# Patient Record
Sex: MALE | Race: WHITE | Employment: OTHER | URBAN - METROPOLITAN AREA
[De-identification: names, ages, dates, MRNs, and addresses within clinical notes are randomized per-mention and may not be internally consistent; named-entity substitution may affect disease eponyms.]

---

## 2017-09-18 ENCOUNTER — HOSPITAL ENCOUNTER (OUTPATIENT)
Age: 62
Discharge: HOME | End: 2017-09-18
Attending: INTERNAL MEDICINE
Payer: COMMERCIAL

## 2017-09-18 DIAGNOSIS — F41.9 ANXIETY: ICD-10-CM

## 2017-09-18 DIAGNOSIS — G47.00 INSOMNIA: ICD-10-CM

## 2017-09-18 DIAGNOSIS — R53.1 RIGHT SIDED WEAKNESS: ICD-10-CM

## 2017-09-18 LAB — CREAT UR-MCNC: 1.2 MG/DL (ref 0.6–1.3)

## 2017-09-18 PROCEDURE — 70553 MRI BRAIN STEM W/O & W/DYE: CPT

## 2017-09-18 PROCEDURE — 74011250636 HC RX REV CODE- 250/636

## 2017-09-18 PROCEDURE — A9585 GADOBUTROL INJECTION: HCPCS

## 2017-09-18 PROCEDURE — 82565 ASSAY OF CREATININE: CPT

## 2017-09-18 RX ADMIN — GADOBUTROL 7.5 ML: 604.72 INJECTION INTRAVENOUS at 18:00

## 2017-11-28 ENCOUNTER — OFFICE VISIT (OUTPATIENT)
Dept: NEUROLOGY | Age: 62
End: 2017-11-28

## 2017-11-28 VITALS
SYSTOLIC BLOOD PRESSURE: 142 MMHG | DIASTOLIC BLOOD PRESSURE: 84 MMHG | BODY MASS INDEX: 24.77 KG/M2 | RESPIRATION RATE: 16 BRPM | HEIGHT: 70 IN | HEART RATE: 69 BPM | TEMPERATURE: 96.9 F | OXYGEN SATURATION: 96 % | WEIGHT: 173 LBS

## 2017-11-28 DIAGNOSIS — G21.11 NEUROLEPTIC-INDUCED PARKINSONISM (HCC): Primary | ICD-10-CM

## 2017-11-28 RX ORDER — BENZTROPINE MESYLATE 1 MG/1
TABLET ORAL
Refills: 0 | COMMUNITY
Start: 2017-11-14

## 2017-11-28 RX ORDER — TRAZODONE HYDROCHLORIDE 50 MG/1
TABLET ORAL
Refills: 0 | COMMUNITY
Start: 2017-11-13

## 2017-11-28 RX ORDER — ESZOPICLONE 3 MG/1
TABLET, FILM COATED ORAL
COMMUNITY
Start: 2017-11-26

## 2017-11-28 RX ORDER — OLANZAPINE 5 MG/1
TABLET ORAL
Refills: 3 | COMMUNITY
Start: 2017-11-09

## 2017-11-28 RX ORDER — OMEPRAZOLE 20 MG/1
CAPSULE, DELAYED RELEASE ORAL
Refills: 1 | COMMUNITY
Start: 2017-11-13

## 2017-11-28 RX ORDER — HYDROXYZINE PAMOATE 50 MG/1
CAPSULE ORAL
Refills: 2 | COMMUNITY
Start: 2017-11-13

## 2017-11-28 NOTE — COMMUNICATION BODY
Macy Delarosa is a 57 y.o., right handed male, with an established history of hypertension, bipolar disorder, who comes in with complaints of difficulty of walking. He feels as if he can't seem to start waling easily. He takes small steps. He's also noted a shaking/tremor of the right hand more than the left. He feels weak and has no energy. This has been going on for about 8 months to one year. He does see a psychiatrist who placed him on [facility] for the gait disorder, but he hasn't noticed any improvement in the gait and jerking of the hand. Prior to a year ago he had some involvement with shaking and involvement of the right arm and abnormal movements of the tongue. He had this even prior to addition of the neuroleptics he's currently on.  He's been diagnosed with [employer] dance. Social History; he's  lives with his wife. He drink occasionally, drinks 2-3 cups of wine every other day. He denies use of tobacco currently, quit 18 months ago a PPD for years. No illicit drugs. He's retired from [employer], he was an . Family History; mother  from old age, father  from cancer, had hypertension. 6 siblings, prostate cancer and stroke. Current Outpatient Prescriptions   Medication Sig Dispense Refill    OLANZapine (ZYPREXA) 5 mg tablet TK 1 T PO QAM  3    omeprazole (PRILOSEC) 20 mg capsule TK 1 C PO QD 30 MIN AC  1    hydrOXYzine pamoate (VISTARIL) 50 mg capsule TK ONE C PO  Q 12 H PRA  2    benztropine (COGENTIN) 1 mg tablet TK 1 T PO Q 12 H  0    eszopiclone (LUNESTA) 3 mg tablet       traZODone (DESYREL) 50 mg tablet TK 1 TO 2 TS PO HS  0    VALBENAZINE TOSYLATE (INGREZZA PO) Take 80 mg by mouth daily.  cholecalciferol (VITAMIN D3) 1,000 unit tablet Take  by mouth daily.  ascorbic acid (VITAMIN C) 500 mg tablet Take 1,000 mg by mouth daily.  lisinopril-hydrochlorothiazide (ZESTORETIC) 10-12.5 mg per tablet Take  by mouth daily. Past Medical History:   Diagnosis Date    Detached retina     Elevated PSA     GERD (gastroesophageal reflux disease)     Hypertension     Lumbar back pain     Osteopenia     Prostate cancer [facility])        Past Surgical History:   Procedure Laterality Date    HX CATARACT REMOVAL      HX HERNIA REPAIR Bilateral 1973    HX LUMBAR LAMINECTOMY  2009    HX PROSTATECTOMY         Allergies   Allergen Reactions    Morphine Other (comments)     hallucination       Patient Active Problem List   Diagnosis Code    Hypertension I10    Glaucoma H40.9    Lumbar back pain M54.5    Osteopenia M85.80    Elevated PSA R97.20    Prostate cancer ([address] C61    Chest pain on exertion R07.9    Tobacco abuse counseling Z71.6    Cancer of prostate with high recurrence risk (stage T3a or Lou 8-10 or PSA > 20) (HCC) C61    Erectile dysfunction following radical prostatectomy N52.31         Review of Systems: prostate cancer, received radiation therapy. As above otherwise 11 point review of systems negative including;   Constitutional no fever or chills  Skin denies rash or itching  HENT  Denies tinnitus, hearing lose  Eyes denies diplopia vision lose  Respiratory denies shortness of breath  Cardiovascular denies chest pain, dyspnea on exertion  Gastrointestinal denies nausea, vomiting, diarrhea, constipation  Genitourinary denies incontinence  Musculoskeletal denies joint pain or swelling  Endocrine denies weight change  Hematology denies easy bruising or bleeding   Neurological as above in HPI      PHYSICAL EXAMINATION:      VITAL SIGNS:    Visit Vitals    /84 (BP 1 Location: Right arm, BP Patient Position: Sitting)    Pulse 69    Temp 96.9 °F (36.1 °C) (Oral)    Resp 16    Ht 5' 10\" (1.778 m)    Wt 78.5 kg (173 lb)    SpO2 96%    BMI 24.82 kg/m2       GENERAL: The patient is well developed, well nourished, and in no apparent distress. EXTREMITIES: No clubbing, cyanosis, or edema is identified. Pulses 2+ and symmetrical.  Muscle tone is normal.  HEAD:   Ear, nose, and throat appear to be without trauma. The patient is normocephalic. NEUROLOGIC EXAMINATION    MENTAL STATUS: The patient is awake, alert, and oriented x 4. Fund of knowledge is adequate. Speech is fluent and memory appears to be intact, both long and short term. CRANIAL NERVES: II  Visual fields are full to confrontation. Funduscopic examination reveals flat disks bilaterally. Pupils are both 4 mm and briskly reactive to light and accommodation. III, IV, VI  Extraocular movements are intact and there is no nystagmus. V  Facial sensation is intact to pinprick and light touch. VII  Face is symmetrical.  He has masked facies. VIII - Hearing is present. IX, X, [address] rises symmetrically. Gag is present. Tongue is in the midline. XI - Shoulder shrugging and head turning intact  MOTOR:  The patient is 5/5 in all four limbs without any drift. Fine finger movements are symmetrical.  Isolated motor group testing reveals no focal abnormalities. Tone is normal.  Sensory examination is intact to pinprick, light touch and position sense testing. Reflexes are 2+ and symmetrical. Plantars are down going. Cerebellar examination reveals no gross ataxia or dysmetria. Gait is abnormal, he has small steps and tends to shuffle. He has prominent cogwheel rigidity of the right worse than the left arm. He has great difficulty getting up from a chair without the use of his arms.          CBC:   Lab Results   Component Value Date/Time    WBC 12.3 10/02/2015 12:06 PM    RBC 4.83 10/02/2015 12:06 PM    HGB 12.4 10/17/2015 05:30 AM    HCT 37.2 10/17/2015 05:30 AM    PLATELET 375 64/74/5687 12:06 PM     BMP:   Lab Results   Component Value Date/Time    Glucose 99 10/17/2015 05:30 AM    Sodium 142 10/17/2015 05:30 AM    Potassium 4.2 10/17/2015 05:30 AM    Chloride 104 10/17/2015 05:30 AM    CO2 31 10/17/2015 05:30 AM    BUN 12 10/17/2015 05:30 AM    Creatinine 1.08 10/17/2015 05:30 AM    Calcium 8.1 10/17/2015 05:30 AM     CMP:   Lab Results   Component Value Date/Time    Glucose 99 10/17/2015 05:30 AM    Sodium 142 10/17/2015 05:30 AM    Potassium 4.2 10/17/2015 05:30 AM    Chloride 104 10/17/2015 05:30 AM    CO2 31 10/17/2015 05:30 AM    BUN 12 10/17/2015 05:30 AM    Creatinine 1.08 10/17/2015 05:30 AM    Calcium 8.1 10/17/2015 05:30 AM    Anion gap 7 10/17/2015 05:30 AM    BUN/Creatinine ratio 11 10/17/2015 05:30 AM     Coagulation: No results found for: PTP, INR, APTT, PTTT  Cardiac markers:   Lab Results   Component Value Date/Time    CK 89 10/18/2015 03:33 PM    CK-MB Index 1.0 10/18/2015 03:33 PM          Impression: Significant parkinsonism in this patient who has risk factors including exposure to Zyprexa over the past 6 months or so. It seems that his symptoms may have started at the same time that he was started on the Zyprexa for his depression. He has masked facies, postural instability, short steps as well as a shuffling gait. He also has cogwheel rigidity. All these really are pointing toward a parkinsonism. Insofar as has been on the Zyprexa for the past 6 months I cannot call it idiopathic Parkinson's disease since he has been exposed to a drug that does have potential side effects of Parkinson's. Plan: Before starting him on any medications at like to see how he does off of this medication. I will not take him off the medicine but asked that a psychiatrist consider tapering and switching his medication regimen. I see that he was placed on the valbenzine, which can help with a tardive dyskinesia but it does not seem to really make a difference in this man's parkinsonism. If his symptoms persist after withdrawal from this medication then I would strongly consider placing him on medicines for Parkinson's disease and then will consider him to have idiopathic parkinsonism. No further workup warranted at this time.   Will follow closely with you. PLEASE NOTE:   This document has been produced using voice recognition software. Unrecognized errors in transcription may be present.

## 2017-11-28 NOTE — PROGRESS NOTES
Gabriela Collier is a 57 y.o., right handed male, with an established history of hypertension, bipolar disorder, who comes in with complaints of difficulty of walking. He feels as if he can't seem to start waling easily. He takes small steps. He's also noted a shaking/tremor of the right hand more than the left. He feels weak and has no energy. This has been going on for about 8 months to one year. He does see a psychiatrist who placed him on [employer] [facility] for the gait disorder, but he hasn't noticed any improvement in the gait and jerking of the hand. Prior to a year ago he had some involvement with shaking and involvement of the right arm and abnormal movements of the tongue. He had this even prior to addition of the neuroleptics he's currently on.  He's been diagnosed with [employer] dance. Social History; he's  lives with his wife. He drink occasionally, drinks 2-3 cups of wine every other day. He denies use of tobacco currently, quit 18 months ago a PPD for years. No illicit drugs. He's retired from [employer], he was an . Family History; mother  from old age, father  from cancer, had hypertension. 6 siblings, prostate cancer and stroke. Current Outpatient Prescriptions   Medication Sig Dispense Refill    OLANZapine (ZYPREXA) 5 mg tablet TK 1 T PO QAM  3    omeprazole (PRILOSEC) 20 mg capsule TK 1 C PO QD 30 MIN AC  1    hydrOXYzine pamoate (VISTARIL) 50 mg capsule TK ONE C PO  Q 12 H PRA  2    benztropine (COGENTIN) 1 mg tablet TK 1 T PO Q 12 H  0    eszopiclone (LUNESTA) 3 mg tablet       traZODone (DESYREL) 50 mg tablet TK 1 TO 2 TS PO HS  0    VALBENAZINE TOSYLATE (INGREZZA PO) Take 80 mg by mouth daily.  cholecalciferol (VITAMIN D3) 1,000 unit tablet Take  by mouth daily.  ascorbic acid (VITAMIN C) 500 mg tablet Take 1,000 mg by mouth daily.  lisinopril-hydrochlorothiazide (ZESTORETIC) 10-12.5 mg per tablet Take  by mouth daily. Past Medical History:   Diagnosis Date    Detached retina     Elevated PSA     GERD (gastroesophageal reflux disease)     Hypertension     Lumbar back pain     Osteopenia     Prostate cancer [facility])        Past Surgical History:   Procedure Laterality Date    HX CATARACT REMOVAL      HX HERNIA REPAIR Bilateral 1973    HX LUMBAR LAMINECTOMY  2009    HX PROSTATECTOMY         Allergies   Allergen Reactions    Morphine Other (comments)     hallucination       Patient Active Problem List   Diagnosis Code    Hypertension I10    Glaucoma H40.9    Lumbar back pain M54.5    Osteopenia M85.80    Elevated PSA R97.20    Prostate cancer ([address] C61    Chest pain on exertion R07.9    Tobacco abuse counseling Z71.6    Cancer of prostate with high recurrence risk (stage T3a or Lou 8-10 or PSA > 20) (HCC) C61    Erectile dysfunction following radical prostatectomy N52.31         Review of Systems: prostate cancer, received radiation therapy. As above otherwise 11 point review of systems negative including;   Constitutional no fever or chills  Skin denies rash or itching  HENT  Denies tinnitus, hearing lose  Eyes denies diplopia vision lose  Respiratory denies shortness of breath  Cardiovascular denies chest pain, dyspnea on exertion  Gastrointestinal denies nausea, vomiting, diarrhea, constipation  Genitourinary denies incontinence  Musculoskeletal denies joint pain or swelling  Endocrine denies weight change  Hematology denies easy bruising or bleeding   Neurological as above in HPI      PHYSICAL EXAMINATION:      VITAL SIGNS:    Visit Vitals    /84 (BP 1 Location: Right arm, BP Patient Position: Sitting)    Pulse 69    Temp 96.9 °F (36.1 °C) (Oral)    Resp 16    Ht 5' 10\" (1.778 m)    Wt 78.5 kg (173 lb)    SpO2 96%    BMI 24.82 kg/m2       GENERAL: The patient is well developed, well nourished, and in no apparent distress. EXTREMITIES: No clubbing, cyanosis, or edema is identified. Pulses 2+ and symmetrical.  Muscle tone is normal.  HEAD:   Ear, nose, and throat appear to be without trauma. The patient is normocephalic. NEUROLOGIC EXAMINATION    MENTAL STATUS: The patient is awake, alert, and oriented x 4. Fund of knowledge is adequate. Speech is fluent and memory appears to be intact, both long and short term. CRANIAL NERVES: II  Visual fields are full to confrontation. Funduscopic examination reveals flat disks bilaterally. Pupils are both 4 mm and briskly reactive to light and accommodation. III, IV, VI  Extraocular movements are intact and there is no nystagmus. V  Facial sensation is intact to pinprick and light touch. VII  Face is symmetrical.  He has masked facies. VIII - Hearing is present. IX, X, [address] rises symmetrically. Gag is present. Tongue is in the midline. XI - Shoulder shrugging and head turning intact  MOTOR:  The patient is 5/5 in all four limbs without any drift. Fine finger movements are symmetrical.  Isolated motor group testing reveals no focal abnormalities. Tone is normal.  Sensory examination is intact to pinprick, light touch and position sense testing. Reflexes are 2+ and symmetrical. Plantars are down going. Cerebellar examination reveals no gross ataxia or dysmetria. Gait is abnormal, he has small steps and tends to shuffle. He has prominent cogwheel rigidity of the right worse than the left arm. He has great difficulty getting up from a chair without the use of his arms.          CBC:   Lab Results   Component Value Date/Time    WBC 12.3 10/02/2015 12:06 PM    RBC 4.83 10/02/2015 12:06 PM    HGB 12.4 10/17/2015 05:30 AM    HCT 37.2 10/17/2015 05:30 AM    PLATELET 835 13/16/0196 12:06 PM     BMP:   Lab Results   Component Value Date/Time    Glucose 99 10/17/2015 05:30 AM    Sodium 142 10/17/2015 05:30 AM    Potassium 4.2 10/17/2015 05:30 AM    Chloride 104 10/17/2015 05:30 AM    CO2 31 10/17/2015 05:30 AM    BUN 12 10/17/2015 05:30 AM    Creatinine 1.08 10/17/2015 05:30 AM    Calcium 8.1 10/17/2015 05:30 AM     CMP:   Lab Results   Component Value Date/Time    Glucose 99 10/17/2015 05:30 AM    Sodium 142 10/17/2015 05:30 AM    Potassium 4.2 10/17/2015 05:30 AM    Chloride 104 10/17/2015 05:30 AM    CO2 31 10/17/2015 05:30 AM    BUN 12 10/17/2015 05:30 AM    Creatinine 1.08 10/17/2015 05:30 AM    Calcium 8.1 10/17/2015 05:30 AM    Anion gap 7 10/17/2015 05:30 AM    BUN/Creatinine ratio 11 10/17/2015 05:30 AM     Coagulation: No results found for: PTP, INR, APTT, PTTT  Cardiac markers:   Lab Results   Component Value Date/Time    CK 89 10/18/2015 03:33 PM    CK-MB Index 1.0 10/18/2015 03:33 PM          Impression: Significant parkinsonism in this patient who has risk factors including exposure to Zyprexa over the past 6 months or so. It seems that his symptoms may have started at the same time that he was started on the Zyprexa for his depression. He has masked facies, postural instability, short steps as well as a shuffling gait. He also has cogwheel rigidity. All these really are pointing toward a parkinsonism. Insofar as has been on the Zyprexa for the past 6 months I cannot call it idiopathic Parkinson's disease since he has been exposed to a drug that does have potential side effects of Parkinson's. Plan: Before starting him on any medications at like to see how he does off of this medication. I will not take him off the medicine but asked that a psychiatrist consider tapering and switching his medication regimen. I see that he was placed on the valbenzine, which can help with a tardive dyskinesia but it does not seem to really make a difference in this man's parkinsonism. If his symptoms persist after withdrawal from this medication then I would strongly consider placing him on medicines for Parkinson's disease and then will consider him to have idiopathic parkinsonism. No further workup warranted at this time.   Will follow closely with you. PLEASE NOTE:   This document has been produced using voice recognition software. Unrecognized errors in transcription may be present.

## 2017-11-28 NOTE — PROGRESS NOTES
Chief Complaint   Patient presents with   Pascual Establish Care    Neurologic Problem     Referred by Dr. Krystal Sacks for gait eval

## 2017-11-28 NOTE — MR AVS SNAPSHOT
Visit Information Date & Time Provider Department Dept. Phone Encounter #  
 11/28/2017 10:00 AM Tracey Rangel MD [address] [place] [address] at [address] [place] [address] [id number] Follow-up Instructions Return in about 4 weeks (around 12/26/2017). Follow-up and Disposition History Your Appointments 11/29/2017 10:15 AM  
Nurse Visit with CRISTIANE [facility] [address] ([address]) Appt Note: PSA  
 [address] [place] [address] [address] [facility] [id number]  
  
    
 12/14/2017 10:30 AM  
ESTABLISHED PATIENT with Minal Draper MD  
[facility] [address]) Appt Note: 6mo F/u PSA Prior [address] [facility] [address] [facility] Upcoming Health Maintenance Date Due Hepatitis C Screening 1955 DTaP/Tdap/Td series (1 - Tdap) 3/18/1974 FOBT Q 1 YEAR AGE 50-75 3/18/2003 ZOSTER VACCINE AGE 60> 1/18/2013 Pneumococcal 19-64 Highest Risk (2 of 3 - PCV13) 10/6/2016 Influenza Age 5 to Adult 8/1/2017 Allergies as of 11/28/2017  Review Complete On: 11/28/2017 By: Alena Collins LPN Severity Noted Reaction Type Reactions Morphine  10/07/2015    Other (comments)  
 hallucination Current Immunizations  Never Reviewed Name Date Influenza Vaccine (Quad) PF 10/19/2015  4:34 PM  
  
 Not reviewed this visit You Were Diagnosed With   
  
 Codes Comments Neuroleptic-induced Parkinsonism ([facility]    -  Primary ICD-10-CM: G21.11 ICD-9-CM: 332.1, T411.4 Vitals BP Pulse Temp Resp Height(growth percentile) Weight(growth percentile) 142/84 (BP 1 Location: Right arm, BP Patient Position: Sitting) 69 96.9 °F (36.1 °C) (Oral) 16 5' 10\" (1.778 m) 173 lb (78.5 kg) SpO2 BMI Smoking Status 96% 24.82 kg/m2 Former Smoker Vitals History BMI and BSA Data Remember, MyChart is NOT to be used for urgent needs. For medical emergencies, dial 911. Now available from your iPhone and Android! Please provide this summary of care documentation to your next provider. Your primary care clinician is listed as Homero Roberts. If you have any questions after today's visit, please call [phone].

## 2017-11-28 NOTE — LETTER
2017 10:57 AM 
 
Patient:  Mirian Peter YOB: 1955 Date of Visit: 2017 Dear Dena García MD 
[address] [facility] [address] VIA Facsimile: [phone] Kari Yancye NP 
[facility] [place] [address] [place] Facsimile: [phone] 
 : 
 
 
Thank you for referring Mr. Mirian Peter to me for evaluation/treatment. Below are the relevant portions of my assessment and plan of care. Mirian Peter is a 57 y.o., right handed male, with an established history of hypertension, bipolar disorder, who comes in with complaints of difficulty of walking. He feels as if he can't seem to start waling easily. He takes small steps. He's also noted a shaking/tremor of the right hand more than the left. He feels weak and has no energy. This has been going on for about 8 months to one year. He does see a psychiatrist who placed him on [place] Broom for the gait disorder, but he hasn't noticed any improvement in the gait and jerking of the hand. Prior to a year ago he had some involvement with shaking and involvement of the right arm and abnormal movements of the tongue. He had this even prior to addition of the neuroleptics he's currently on.  He's been diagnosed with [employer] dance. Social History; he's  lives with his wife. He drink occasionally, drinks 2-3 cups of wine every other day. He denies use of tobacco currently, quit 18 months ago a PPD for years. No illicit drugs. He's retired from [employer], he was an . Family History; mother  from old age, father  from cancer, had hypertension. 6 siblings, prostate cancer and stroke. Current Outpatient Prescriptions Medication Sig Dispense Refill  OLANZapine (ZYPREXA) 5 mg tablet TK 1 T PO QAM  3  
 omeprazole (PRILOSEC) 20 mg capsule TK 1 C PO QD 30 MIN AC  1  
 hydrOXYzine pamoate (VISTARIL) 50 mg capsule TK ONE C PO  Q 12 H PRA  2  
  benztropine (COGENTIN) 1 mg tablet TK 1 T PO Q 12 H  0  
 eszopiclone (LUNESTA) 3 mg tablet  traZODone (DESYREL) 50 mg tablet TK 1 TO 2 TS PO HS  0  
 VALBENAZINE TOSYLATE (INGREZZA PO) Take 80 mg by mouth daily.  cholecalciferol (VITAMIN D3) 1,000 unit tablet Take  by mouth daily.  ascorbic acid (VITAMIN C) 500 mg tablet Take 1,000 mg by mouth daily.  lisinopril-hydrochlorothiazide (ZESTORETIC) 10-12.5 mg per tablet Take  by mouth daily. Past Medical History:  
Diagnosis Date  Detached retina  Elevated PSA  GERD (gastroesophageal reflux disease)  Hypertension  Lumbar back pain  Osteopenia  Prostate cancer ([address] Past Surgical History:  
Procedure Laterality Date  HX CATARACT REMOVAL    
 HX HERNIA REPAIR Bilateral 1973  HX LUMBAR LAMINECTOMY  2009  HX PROSTATECTOMY Allergies Allergen Reactions  Morphine Other (comments)  
  hallucination Patient Active Problem List  
Diagnosis Code  Hypertension I10  
 Glaucoma H40.9  Lumbar back pain M54.5  Osteopenia M85.80  Elevated PSA R97.20  Prostate cancer ([address] C61  Chest pain on exertion R07.9  Tobacco abuse counseling Z71.6  Cancer of prostate with high recurrence risk (stage T3a or [place] 8-10 or PSA > 20) ([address] C61  Erectile dysfunction following radical prostatectomy N52.31 Review of Systems: prostate cancer, received radiation therapy. As above otherwise 11 point review of systems negative including;  
Constitutional no fever or chills Skin denies rash or itching HENT  Denies tinnitus, hearing lose Eyes denies diplopia vision lose Respiratory denies shortness of breath Cardiovascular denies chest pain, dyspnea on exertion Gastrointestinal denies nausea, vomiting, diarrhea, constipation Genitourinary denies incontinence Musculoskeletal denies joint pain or swelling Endocrine denies weight change Hematology denies easy bruising or bleeding Neurological as above in HPI PHYSICAL EXAMINATION:   
 
VITAL SIGNS:   
Visit Vitals  /84 (BP 1 Location: Right arm, BP Patient Position: Sitting)  Pulse 69  Temp 96.9 °F (36.1 °C) (Oral)  Resp 16  
 Ht 5' 10\" (1.778 m)  Wt 78.5 kg (173 lb)  SpO2 96%  BMI 24.82 kg/m2 GENERAL: The patient is well developed, well nourished, and in no apparent distress. EXTREMITIES: No clubbing, cyanosis, or edema is identified. Pulses 2+ and symmetrical.  Muscle tone is normal. 
HEAD:   Ear, nose, and throat appear to be without trauma. The patient is normocephalic. NEUROLOGIC EXAMINATION 
 
MENTAL STATUS: The patient is awake, alert, and oriented x 4. Fund of knowledge is adequate. Speech is fluent and memory appears to be intact, both long and short term. CRANIAL NERVES: II  Visual fields are full to confrontation. Funduscopic examination reveals flat disks bilaterally. Pupils are both 4 mm and briskly reactive to light and accommodation. III, IV, VI  Extraocular movements are intact and there is no nystagmus. V  Facial sensation is intact to pinprick and light touch. VII  Face is symmetrical.  He has masked facies. VIII - Hearing is present. IX, X, [address] rises symmetrically. Gag is present. Tongue is in the midline. XI - Shoulder shrugging and head turning intact MOTOR:  The patient is 5/5 in all four limbs without any drift. Fine finger movements are symmetrical.  Isolated motor group testing reveals no focal abnormalities. Tone is normal.  Sensory examination is intact to pinprick, light touch and position sense testing. Reflexes are 2+ and symmetrical. Plantars are down going. Cerebellar examination reveals no gross ataxia or dysmetria. Gait is abnormal, he has small steps and tends to shuffle.   He has prominent cogwheel rigidity of the right worse than the left arm. He has great difficulty getting up from a chair without the use of his arms. CBC:  
Lab Results Component Value Date/Time WBC 12.3 10/02/2015 12:06 PM  
 RBC 4.83 10/02/2015 12:06 PM  
 HGB 12.4 10/17/2015 05:30 AM  
 HCT 37.2 10/17/2015 05:30 AM  
 PLATELET 480 69/00/0462 12:06 PM  
 
BMP:  
Lab Results Component Value Date/Time Glucose 99 10/17/2015 05:30 AM  
 Sodium 142 10/17/2015 05:30 AM  
 Potassium 4.2 10/17/2015 05:30 AM  
 Chloride 104 10/17/2015 05:30 AM  
 CO2 31 10/17/2015 05:30 AM  
 BUN 12 10/17/2015 05:30 AM  
 Creatinine 1.08 10/17/2015 05:30 AM  
 Calcium 8.1 10/17/2015 05:30 AM  
 
CMP:  
Lab Results Component Value Date/Time Glucose 99 10/17/2015 05:30 AM  
 Sodium 142 10/17/2015 05:30 AM  
 Potassium 4.2 10/17/2015 05:30 AM  
 Chloride 104 10/17/2015 05:30 AM  
 CO2 31 10/17/2015 05:30 AM  
 BUN 12 10/17/2015 05:30 AM  
 Creatinine 1.08 10/17/2015 05:30 AM  
 Calcium 8.1 10/17/2015 05:30 AM  
 Anion gap 7 10/17/2015 05:30 AM  
 BUN/Creatinine ratio 11 10/17/2015 05:30 AM  
 
Coagulation: No results found for: PTP, INR, APTT, PTTT Cardiac markers:  
Lab Results Component Value Date/Time CK 89 10/18/2015 03:33 PM  
 CK-MB Index 1.0 10/18/2015 03:33 PM  
  
 
 
Impression: Significant parkinsonism in this patient who has risk factors including exposure to Zyprexa over the past 6 months or so. It seems that his symptoms may have started at the same time that he was started on the Zyprexa for his depression. He has masked facies, postural instability, short steps as well as a shuffling gait. He also has cogwheel rigidity. All these really are pointing toward a parkinsonism. Insofar as has been on the Zyprexa for the past 6 months I cannot call it idiopathic Parkinson's disease since he has been exposed to a drug that does have potential side effects of Parkinson's.  
 
Plan: Before starting him on any medications at like to see how he does off of this medication. I will not take him off the medicine but asked that a psychiatrist consider tapering and switching his medication regimen. I see that he was placed on the valbenzine, which can help with a tardive dyskinesia but it does not seem to really make a difference in this man's parkinsonism. If his symptoms persist after withdrawal from this medication then I would strongly consider placing him on medicines for Parkinson's disease and then will consider him to have idiopathic parkinsonism. No further workup warranted at this time. Will follow closely with you. PLEASE NOTE:  
This document has been produced using voice recognition software. Unrecognized errors in transcription may be present. If you have questions, please do not hesitate to call me. I look forward to following Mr. Linda Morris along with you.  
 
 
 
Sincerely, 
 
 
Sigifredo Hernandez MD

## 2017-12-28 ENCOUNTER — TELEPHONE (OUTPATIENT)
Dept: NEUROLOGY | Age: 62
End: 2017-12-28

## 2017-12-28 NOTE — TELEPHONE ENCOUNTER
Amanda Alfred N.P. From [address] [place] [address] calling to speak with Dr. Mily Andujar regarding an evaluation for this pt ASAP. Best # to call is [phone].

## 2018-01-08 NOTE — TELEPHONE ENCOUNTER
Patient wanted to inform Dr. Carmel Roe that mental health doctor placed him back on zyprexa. States that he didn't know if Dr. Carmel Roe wanted to see him in the office since this change. Pt was informed of Dr. Carmel Roe return on today from vacation and that his mental health provider has also left a message for consult with Dr. Carmel Roe as well and will be following up at earliest convience. He verbalized understanding.  Please advise

## 2018-01-10 NOTE — TELEPHONE ENCOUNTER
Matilda Haley MD   You 35 minutes ago (8:55 AM)                 Routine follow up is ok unless his symptoms worsen (Routing comment)         Patient called and informed of Dr. Petey Covarrubias response. Patient verbalized understanding and denies any further issues/ questions.

## 2018-01-30 ENCOUNTER — OFFICE VISIT (OUTPATIENT)
Dept: NEUROLOGY | Age: 63
End: 2018-01-30

## 2018-01-30 VITALS
HEIGHT: 70 IN | OXYGEN SATURATION: 97 % | DIASTOLIC BLOOD PRESSURE: 82 MMHG | TEMPERATURE: 97 F | SYSTOLIC BLOOD PRESSURE: 130 MMHG | RESPIRATION RATE: 16 BRPM | HEART RATE: 67 BPM

## 2018-01-30 DIAGNOSIS — G20 PARKINSON DISEASE (HCC): Primary | ICD-10-CM

## 2018-01-30 RX ORDER — CARBIDOPA AND LEVODOPA 25; 100 MG/1; MG/1
1 TABLET ORAL 3 TIMES DAILY
Qty: 90 TAB | Refills: 5 | Status: SHIPPED | OUTPATIENT
Start: 2018-01-30 | End: 2018-08-26 | Stop reason: SDUPTHER

## 2018-01-30 NOTE — ACP (ADVANCE CARE PLANNING)
1. Have you been to the ER, urgent care clinic since your last visit? Hospitalized since your last visit? No    2. Have you seen or consulted any other health care providers outside of the [address] [place] [address] since your last visit? Include any pap smears or colon screening.  No     Chief Complaint   Patient presents with    Follow-up    Neurologic Problem     Parkinson's

## 2018-01-30 NOTE — LETTER
1/30/2018 4:14 PM 
 
Patient:  Luci Burgess YOB: 1955 Date of Visit: 1/30/2018 Dear Jason Mercer MD 
[address] [id number] [place] [id number] VIA Facsimile: [phone] 
 : Thank you for referring Mr. Luci Burgess to me for evaluation/treatment. Below are the relevant portions of my assessment and plan of care. Re:  Simon Frank up visit     1/30/2018 4:08 PM 
 
SSN: [id number] Subjective:   Luci Burgess returns for follow up. He stopped the Zyprexa, which made little change in his status. He's since gone back on the Zyprexa and feels better but still is plagued with bradykinesia. He notices significant difficulty with sleep. Medications:   
Current Outpatient Prescriptions Medication Sig Dispense Refill  OLANZapine (ZYPREXA) 5 mg tablet TK 1 T PO QAM  3  
 omeprazole (PRILOSEC) 20 mg capsule TK 1 C PO QD 30 MIN AC  1  
 hydrOXYzine pamoate (VISTARIL) 50 mg capsule TK ONE C PO  Q 12 H PRA  2  
 benztropine (COGENTIN) 1 mg tablet TK 1 T PO Q 12 H  0  
 eszopiclone (LUNESTA) 3 mg tablet  traZODone (DESYREL) 50 mg tablet TK 1 TO 2 TS PO HS  0  
 VALBENAZINE TOSYLATE (INGREZZA PO) Take 80 mg by mouth daily.  cholecalciferol (VITAMIN D3) 1,000 unit tablet Take  by mouth daily.  ascorbic acid (VITAMIN C) 500 mg tablet Take 1,000 mg by mouth daily.  lisinopril-hydrochlorothiazide (ZESTORETIC) 10-12.5 mg per tablet Take  by mouth daily. Vital signs:   
Visit Vitals  /82 (BP 1 Location: Right arm, BP Patient Position: Sitting)  Pulse 67  Temp 97 °F (36.1 °C) (Oral)  Resp 16  
 Ht 5' 10\" (1.778 m)  SpO2 97% Review of Systems: As above otherwise 11 point review of systems negative including;  
Constitutional no fever or chills Skin denies rash or itching HEENT  Denies tinnitus, hearing lose Eyes denies diplopia vision lose Respiratory denies sortness of breath Cardiovascular denies chest pain, dyspnea on exertion Gastrointestinal denies nausea, vomiting, diarrhea, constipation Genitourinary denies incontinence Musculoskeletal denies joint pain or swelling Endocrine denies weight change Hematology denies easy bruising or bleeding Neurological as above in HPI Patient Active Problem List  
Diagnosis Code  Hypertension I10  
 Glaucoma H40.9  Lumbar back pain M54.5  Osteopenia M85.80  Elevated PSA R97.20  Prostate cancer ([facility] C61  Chest pain on exertion R07.9  Tobacco abuse counseling Z71.6  Cancer of prostate with high recurrence risk (stage T3a or [place] 8-10 or PSA > 20) ([facility] C61  Erectile dysfunction following radical prostatectomy N52.31 Objective: The patient is awake, alert, and oriented x 4. Fund of knowledge is adequate. Speech is fluent and memory is intact. Cranial Nerves: II  Visual fields are full to confrontation. III, IV, VI  Extraocular movements are intact. There is no nystagmus. V  Facial sensation is intact to pinprick. VII  Face is symmetrical.  VIII - Hearing is present. IX, X, XII  Palate is symmetrical.   XI - Shoulder shrugging and head turning intact Motor: The patient moves all four limbs fairly well and symmetrically. Tone is normal. Reflexes are 2+ and symmetrical. Plantars are down going. Gait is normal.  He has right much worse than the left cogwheel rigidity. CBC:  
Lab Results Component Value Date/Time WBC 12.3 10/02/2015 12:06 PM  
 RBC 4.83 10/02/2015 12:06 PM  
 HGB 12.4 10/17/2015 05:30 AM  
 HCT 37.2 10/17/2015 05:30 AM  
 PLATELET 361 47/10/8799 12:06 PM  
 
BMP:  
Lab Results Component Value Date/Time  Glucose 99 10/17/2015 05:30 AM  
 Sodium 142 10/17/2015 05:30 AM  
 Potassium 4.2 10/17/2015 05:30 AM  
 Chloride 104 10/17/2015 05:30 AM  
 CO2 31 10/17/2015 05:30 AM  
 BUN 12 10/17/2015 05:30 AM  
 Creatinine 1.08 10/17/2015 05:30 AM  
 Calcium 8.1 10/17/2015 05:30 AM  
 
CMP:  
Lab Results Component Value Date/Time Glucose 99 10/17/2015 05:30 AM  
 Sodium 142 10/17/2015 05:30 AM  
 Potassium 4.2 10/17/2015 05:30 AM  
 Chloride 104 10/17/2015 05:30 AM  
 CO2 31 10/17/2015 05:30 AM  
 BUN 12 10/17/2015 05:30 AM  
 Creatinine 1.08 10/17/2015 05:30 AM  
 Calcium 8.1 10/17/2015 05:30 AM  
 Anion gap 7 10/17/2015 05:30 AM  
 BUN/Creatinine ratio 11 10/17/2015 05:30 AM  
 
Coagulation: No results found for: PTP, INR, APTT, PTTT Assessment:  Probably has idiopathic Parkinson's disease in this man who has risk factors including exposure to neuroleptics in the past and currently. I suspect he should be treated at this time with medications for his Parkinson's disease. Plan: Will start on Sinemet 25/100 TID and see him back here here in about 4 weeks. Sincerely, 
 
 
 
Jaswinder Madrid. Anne Marie Stoll M.D. If you have questions, please do not hesitate to call me. I look forward to following Mr. Zack Cardona along with you.  
 
 
 
Sincerely, 
 
 
Martin Cristobal MD

## 2018-01-30 NOTE — MR AVS SNAPSHOT
[address] [facility] 
[phone] Patient: Jaren Lagos MRN: [id number] KASSY:[id number] Visit Information Date & Time Provider Department Dept. Phone Encounter #  
 1/30/2018  4:00 PM Monico Snellen, MD [facility] at [address] [facility] [id number] Follow-up Instructions Return in about 4 weeks (around 2/27/2018). Follow-up and Disposition History Your Appointments 4/3/2018  4:00 PM  
Follow Up with Monico Snellen, MD  
[facility] at [address] [facility]) Appt Note: 4 week fu  
 [address] [facility] [address]  
[id number] [place] [address]  
  
    
 6/5/2018  1:45 PM  
Nurse Visit with [facility] ([facility]) Appt Note: PSA  
 [address] [address]  
  
    
 6/19/2018  1:45 PM  
ESTABLISHED PATIENT with Jama Johnson MD  
[facility]) Appt Note: 6 [facility] PSA PRIOR  
 2000 [facility] [address] Upcoming Health Maintenance Date Due Hepatitis C Screening 1955 DTaP/Tdap/Td series (1 - Tdap) 3/18/1974 FOBT Q 1 YEAR AGE 50-75 3/18/2003 ZOSTER VACCINE AGE 60> 1/18/2013 Pneumococcal 19-64 Highest Risk (2 of 3 - PCV13) 10/6/2016 Influenza Age 5 to Adult 8/1/2017 Allergies as of 1/30/2018  Review Complete On: 1/30/2018 By: Thea Breen LPN Severity Noted Reaction Type Reactions Morphine  10/07/2015    Other (comments)  
 hallucination Current Immunizations  Never Reviewed Name Date Influenza Vaccine (Quad) PF 10/19/2015  4:34 PM  
  
 Not reviewed this visit You Were Diagnosed With   
  
 Codes Comments Parkinson disease ([facility]    -  Primary ICD-10-CM: G20 
ICD-9-CM: 332.0 Vitals BP Pulse Temp Resp Height(growth percentile) SpO2  
 130/82 (BP 1 Location: Right arm, BP Patient Position: Sitting) 67 97 °F (36.1 °C) (Oral) 16 5' 10\" (1.778 m) 97% Smoking Status Former Smoker Vitals History Preferred Pharmacy Pharmacy Name Phone [facility] [phone], [address] AT [address] [phone] Your Updated Medication List  
  
   
This list is accurate as of: 1/30/18  4:19 PM.  Always use your most recent med list.  
  
  
  
  
 benztropine 1 mg tablet Commonly known as:  COGENTIN  
TK 1 T PO Q 12 H  
  
 carbidopa-levodopa  mg per tablet Commonly known as:  SINEMET Take 1 Tab by mouth three (3) times daily. eszopiclone 3 mg tablet Commonly known as:  LUNESTA  
  
 hydrOXYzine pamoate 50 mg capsule Commonly known as:  VISTARIL TK ONE C PO  Q 12 H PRA INGREZZA PO Take 80 mg by mouth daily. OLANZapine 5 mg tablet Commonly known as:  ZyPREXA TK 1 T PO QAM  
  
 omeprazole 20 mg capsule Commonly known as:  PRILOSEC TK 1 C PO QD 30 MIN AC  
  
 traZODone 50 mg tablet Commonly known as:  DESYREL  
TK 1 TO 2 TS PO HS  
  
 VITAMIN C 500 mg tablet Generic drug:  ascorbic acid (vitamin C) Take 1,000 mg by mouth daily. VITAMIN D3 1,000 unit tablet Generic drug:  cholecalciferol Take  by mouth daily. ZESTORETIC 10-12.5 mg per tablet Generic drug:  lisinopril-hydroCHLOROthiazide Take  by mouth daily. Prescriptions Sent to Pharmacy Refills  
 carbidopa-levodopa (SINEMET)  mg per tablet 5 Sig: Take 1 Tab by mouth three (3) times daily. Class: Normal  
 Pharmacy: [facility] [address] [place] [address] AT [address] Ph #: [phone] Route: Oral  
  
Follow-up Instructions Return in about 4 weeks (around 2/27/2018). Introducing [facility] HE[facility]! Dear Kristen Daugherty: Thank you for requesting a [employer] account. Our records indicate that you already have an active [employer] account. You can access your account anytime at [email or web address] Did you know that you can access your hospital and ER discharge instructions at any time in [employer] You can also review all of your test results from your hospital stay or ER visit. Additional Information If you have questions, please visit the Frequently Asked Questions section of the [employer] website at [email or web address] Remember, [employer] is NOT to be used for urgent needs. For medical emergencies, dial 911. Now available from your iPhone and Android! Please provide this summary of care documentation to your next provider. Your primary care clinician is listed as Sundeep Schmidt. If you have any questions after today's visit, please call [phone].

## 2018-01-30 NOTE — COMMUNICATION BODY
Re:  Binu Mae up visit     1/30/2018 4:08 PM    SSN: [id number]    Subjective:   Francisca Ritchie returns for follow up. He stopped the Zyprexa, which made little change in his status. He's since gone back on the Zyprexa and feels better but still is plagued with bradykinesia. He notices significant difficulty with sleep. Medications:    Current Outpatient Prescriptions   Medication Sig Dispense Refill    OLANZapine (ZYPREXA) 5 mg tablet TK 1 T PO QAM  3    omeprazole (PRILOSEC) 20 mg capsule TK 1 C PO QD 30 MIN AC  1    hydrOXYzine pamoate (VISTARIL) 50 mg capsule TK ONE C PO  Q 12 H PRA  2    benztropine (COGENTIN) 1 mg tablet TK 1 T PO Q 12 H  0    eszopiclone (LUNESTA) 3 mg tablet       traZODone (DESYREL) 50 mg tablet TK 1 TO 2 TS PO HS  0    VALBENAZINE TOSYLATE (INGREZZA PO) Take 80 mg by mouth daily.  cholecalciferol (VITAMIN D3) 1,000 unit tablet Take  by mouth daily.  ascorbic acid (VITAMIN C) 500 mg tablet Take 1,000 mg by mouth daily.  lisinopril-hydrochlorothiazide (ZESTORETIC) 10-12.5 mg per tablet Take  by mouth daily.          Vital signs:    Visit Vitals    /82 (BP 1 Location: Right arm, BP Patient Position: Sitting)    Pulse 67    Temp 97 °F (36.1 °C) (Oral)    Resp 16    Ht 5' 10\" (1.778 m)    SpO2 97%       Review of Systems:   As above otherwise 11 point review of systems negative including;   Constitutional no fever or chills  Skin denies rash or itching  HEENT  Denies tinnitus, hearing lose  Eyes denies diplopia vision lose  Respiratory denies sortness of breath  Cardiovascular denies chest pain, dyspnea on exertion  Gastrointestinal denies nausea, vomiting, diarrhea, constipation  Genitourinary denies incontinence  Musculoskeletal denies joint pain or swelling  Endocrine denies weight change  Hematology denies easy bruising or bleeding   Neurological as above in HPI      Patient Active Problem List   Diagnosis Code    Hypertension I10    Glaucoma H40.9    Lumbar back pain M54.5    Osteopenia M85.80    Elevated PSA R97.20    Prostate cancer (HCC) C61    Chest pain on exertion R07.9    Tobacco abuse counseling Z71.6    Cancer of prostate with high recurrence risk (stage T3a or [place] 8-10 or PSA > 20) ([facility]) C61    Erectile dysfunction following radical prostatectomy N52.31         Objective: The patient is awake, alert, and oriented x 4. Fund of knowledge is adequate. Speech is fluent and memory is intact. Cranial Nerves: II - Visual fields are full to confrontation. III, IV, VI - Extraocular movements are intact. There is no nystagmus. V - Facial sensation is intact to pinprick. VII - Face is symmetrical.  VIII - Hearing is present. IX, X, XII - Palate is symmetrical.   XI - Shoulder shrugging and head turning intact  Motor: The patient moves all four limbs fairly well and symmetrically. Tone is normal. Reflexes are 2+ and symmetrical. Plantars are down going. Gait is normal.  He has right much worse than the left cogwheel rigidity.     CBC:   Lab Results   Component Value Date/Time    WBC 12.3 10/02/2015 12:06 PM    RBC 4.83 10/02/2015 12:06 PM    HGB 12.4 10/17/2015 05:30 AM    HCT 37.2 10/17/2015 05:30 AM    PLATELET 407 79/66/8514 12:06 PM     BMP:   Lab Results   Component Value Date/Time    Glucose 99 10/17/2015 05:30 AM    Sodium 142 10/17/2015 05:30 AM    Potassium 4.2 10/17/2015 05:30 AM    Chloride 104 10/17/2015 05:30 AM    CO2 31 10/17/2015 05:30 AM    BUN 12 10/17/2015 05:30 AM    Creatinine 1.08 10/17/2015 05:30 AM    Calcium 8.1 10/17/2015 05:30 AM     CMP:   Lab Results   Component Value Date/Time    Glucose 99 10/17/2015 05:30 AM    Sodium 142 10/17/2015 05:30 AM    Potassium 4.2 10/17/2015 05:30 AM    Chloride 104 10/17/2015 05:30 AM    CO2 31 10/17/2015 05:30 AM    BUN 12 10/17/2015 05:30 AM    Creatinine 1.08 10/17/2015 05:30 AM    Calcium 8.1 10/17/2015 05:30 AM    Anion gap 7 10/17/2015 05:30 AM    BUN/Creatinine ratio 11 10/17/2015 05:30 AM     Coagulation: No results found for: PTP, INR, APTT, PTTT    Assessment:  Probably has idiopathic Parkinson's disease in this man who has risk factors including exposure to neuroleptics in the past and currently. I suspect he should be treated at this time with medications for his Parkinson's disease. Plan: Will start on Sinemet 25/100 TID and see him back here here in about 4 weeks. Sincerely,        Elizabeth Martínez.  Lisa Ibarra M.D.

## 2018-01-30 NOTE — PROGRESS NOTES
Re:  Chantelle Sing up visit     1/30/2018 4:08 PM    SSN: [id number]    Subjective:   Efraín Boyer returns for follow up. He stopped the Zyprexa, which made little change in his status. He's since gone back on the Zyprexa and feels better but still is plagued with bradykinesia. He notices significant difficulty with sleep. Medications:    Current Outpatient Prescriptions   Medication Sig Dispense Refill    OLANZapine (ZYPREXA) 5 mg tablet TK 1 T PO QAM  3    omeprazole (PRILOSEC) 20 mg capsule TK 1 C PO QD 30 MIN AC  1    hydrOXYzine pamoate (VISTARIL) 50 mg capsule TK ONE C PO  Q 12 H PRA  2    benztropine (COGENTIN) 1 mg tablet TK 1 T PO Q 12 H  0    eszopiclone (LUNESTA) 3 mg tablet       traZODone (DESYREL) 50 mg tablet TK 1 TO 2 TS PO HS  0    VALBENAZINE TOSYLATE (INGREZZA PO) Take 80 mg by mouth daily.  cholecalciferol (VITAMIN D3) 1,000 unit tablet Take  by mouth daily.  ascorbic acid (VITAMIN C) 500 mg tablet Take 1,000 mg by mouth daily.  lisinopril-hydrochlorothiazide (ZESTORETIC) 10-12.5 mg per tablet Take  by mouth daily.          Vital signs:    Visit Vitals    /82 (BP 1 Location: Right arm, BP Patient Position: Sitting)    Pulse 67    Temp 97 °F (36.1 °C) (Oral)    Resp 16    Ht 5' 10\" (1.778 m)    SpO2 97%       Review of Systems:   As above otherwise 11 point review of systems negative including;   Constitutional no fever or chills  Skin denies rash or itching  HEENT  Denies tinnitus, hearing lose  Eyes denies diplopia vision lose  Respiratory denies sortness of breath  Cardiovascular denies chest pain, dyspnea on exertion  Gastrointestinal denies nausea, vomiting, diarrhea, constipation  Genitourinary denies incontinence  Musculoskeletal denies joint pain or swelling  Endocrine denies weight change  Hematology denies easy bruising or bleeding   Neurological as above in HPI      Patient Active Problem List   Diagnosis Code    Hypertension I10    Glaucoma H40.9    Lumbar back pain M54.5    Osteopenia M85.80    Elevated PSA R97.20    Prostate cancer (HCC) C61    Chest pain on exertion R07.9    Tobacco abuse counseling Z71.6    Cancer of prostate with high recurrence risk (stage T3a or [place] 8-10 or PSA > 20) ([facility]) C61    Erectile dysfunction following radical prostatectomy N52.31         Objective: The patient is awake, alert, and oriented x 4. Fund of knowledge is adequate. Speech is fluent and memory is intact. Cranial Nerves: II - Visual fields are full to confrontation. III, IV, VI - Extraocular movements are intact. There is no nystagmus. V - Facial sensation is intact to pinprick. VII - Face is symmetrical.  VIII - Hearing is present. IX, X, XII - Palate is symmetrical.   XI - Shoulder shrugging and head turning intact  Motor: The patient moves all four limbs fairly well and symmetrically. Tone is normal. Reflexes are 2+ and symmetrical. Plantars are down going. Gait is normal.  He has right much worse than the left cogwheel rigidity.     CBC:   Lab Results   Component Value Date/Time    WBC 12.3 10/02/2015 12:06 PM    RBC 4.83 10/02/2015 12:06 PM    HGB 12.4 10/17/2015 05:30 AM    HCT 37.2 10/17/2015 05:30 AM    PLATELET 783 71/66/1384 12:06 PM     BMP:   Lab Results   Component Value Date/Time    Glucose 99 10/17/2015 05:30 AM    Sodium 142 10/17/2015 05:30 AM    Potassium 4.2 10/17/2015 05:30 AM    Chloride 104 10/17/2015 05:30 AM    CO2 31 10/17/2015 05:30 AM    BUN 12 10/17/2015 05:30 AM    Creatinine 1.08 10/17/2015 05:30 AM    Calcium 8.1 10/17/2015 05:30 AM     CMP:   Lab Results   Component Value Date/Time    Glucose 99 10/17/2015 05:30 AM    Sodium 142 10/17/2015 05:30 AM    Potassium 4.2 10/17/2015 05:30 AM    Chloride 104 10/17/2015 05:30 AM    CO2 31 10/17/2015 05:30 AM    BUN 12 10/17/2015 05:30 AM    Creatinine 1.08 10/17/2015 05:30 AM    Calcium 8.1 10/17/2015 05:30 AM    Anion gap 7 10/17/2015 05:30 AM    BUN/Creatinine ratio 11 10/17/2015 05:30 AM     Coagulation: No results found for: PTP, INR, APTT, PTTT    Assessment:  Probably has idiopathic Parkinson's disease in this man who has risk factors including exposure to neuroleptics in the past and currently. I suspect he should be treated at this time with medications for his Parkinson's disease. Plan: Will start on Sinemet 25/100 TID and see him back here here in about 4 weeks. Sincerely,        Laverne Patel M.D.

## 2018-04-03 ENCOUNTER — OFFICE VISIT (OUTPATIENT)
Dept: NEUROLOGY | Age: 63
End: 2018-04-03

## 2018-04-03 VITALS
TEMPERATURE: 97.3 F | HEART RATE: 80 BPM | HEIGHT: 70 IN | RESPIRATION RATE: 16 BRPM | WEIGHT: 175 LBS | SYSTOLIC BLOOD PRESSURE: 118 MMHG | BODY MASS INDEX: 25.05 KG/M2 | DIASTOLIC BLOOD PRESSURE: 82 MMHG | OXYGEN SATURATION: 93 %

## 2018-04-03 DIAGNOSIS — G20 PARKINSON DISEASE (HCC): Primary | ICD-10-CM

## 2018-04-03 RX ORDER — PRAMIPEXOLE DIHYDROCHLORIDE 0.25 MG/1
0.25 TABLET ORAL 3 TIMES DAILY
Qty: 90 TAB | Refills: 6 | Status: SHIPPED | OUTPATIENT
Start: 2018-04-03 | End: 2019-10-03 | Stop reason: SDUPTHER

## 2018-04-03 NOTE — PROGRESS NOTES
Chief Complaint   Patient presents with    Follow-up    Neurologic Problem     Parkinson's     1. Have you been to the ER, urgent care clinic since your last visit? Hospitalized since your last visit? No    2. Have you seen or consulted any other health care providers outside of the [address] [place] [address] since your last visit? Include any pap smears or colon screening. No    Fall Risk Assessment, last 12 mths 4/3/2018   Able to walk? Yes   Fall in past 12 months?  No

## 2018-04-03 NOTE — PROGRESS NOTES
Re:  Esthela Parents up visit     4/3/2018 2:58 PM      SSN: [id number]    Subjective:   Williams Capone returns for follow up. He's not had any dramatic improvement in his gait. No side effect with the Sinemet. Medications:    Current Outpatient Prescriptions   Medication Sig Dispense Refill    carbidopa-levodopa (SINEMET)  mg per tablet Take 1 Tab by mouth three (3) times daily. 90 Tab 5    OLANZapine (ZYPREXA) 5 mg tablet TK 1 T PO QAM  3    omeprazole (PRILOSEC) 20 mg capsule TK 1 C PO QD 30 MIN AC  1    hydrOXYzine pamoate (VISTARIL) 50 mg capsule TK ONE C PO  Q 12 H PRA  2    benztropine (COGENTIN) 1 mg tablet TK 1 T PO Q 12 H  0    eszopiclone (LUNESTA) 3 mg tablet       traZODone (DESYREL) 50 mg tablet TK 1 TO 2 TS PO HS  0    VALBENAZINE TOSYLATE (INGREZZA PO) Take 80 mg by mouth daily.  cholecalciferol (VITAMIN D3) 1,000 unit tablet Take  by mouth daily.  ascorbic acid (VITAMIN C) 500 mg tablet Take 1,000 mg by mouth daily.  lisinopril-hydrochlorothiazide (ZESTORETIC) 10-12.5 mg per tablet Take  by mouth daily.          Vital signs:    Visit Vitals    /82 (BP 1 Location: Right arm, BP Patient Position: Sitting)    Pulse 80    Temp 97.3 °F (36.3 °C) (Oral)    Resp 16    Ht 5' 10\" (1.778 m)    Wt 79.4 kg (175 lb)    SpO2 93%    BMI 25.11 kg/m2       Review of Systems:   As above otherwise 11 point review of systems negative including;   Constitutional no fever or chills  Skin denies rash or itching  HEENT  Denies tinnitus, hearing lose  Eyes denies diplopia vision lose  Respiratory denies sortness of breath  Cardiovascular denies chest pain, dyspnea on exertion  Gastrointestinal denies nausea, vomiting, diarrhea, constipation  Genitourinary denies incontinence  Musculoskeletal denies joint pain or swelling  Endocrine denies weight change  Hematology denies easy bruising or bleeding   Neurological as above in HPI      Patient Active Problem List   Diagnosis Code    Hypertension I10    Glaucoma H40.9    Lumbar back pain M54.5    Osteopenia M85.80    Elevated PSA R97.20    Prostate cancer ([address] C61    Chest pain on exertion R07.9    Tobacco abuse counseling Z71.6    Cancer of prostate with high recurrence risk (stage T3a or Lou 8-10 or PSA > 20) C61    Erectile dysfunction following radical prostatectomy N52.31    Parkinson disease ([facility] G20         Objective: The patient is awake, alert, and oriented x 4. Fund of knowledge is adequate. Speech is fluent and memory is intact. Cranial Nerves: II  Visual fields are full to confrontation. III, IV, VI  Extraocular movements are intact. There is no nystagmus. V  Facial sensation is intact to pinprick. VII  Face is symmetrical.  VIII - Hearing is present. IX, X, XII  Palate is symmetrical.   XI - Shoulder shrugging and head turning intact  Motor: The patient moves all four limbs fairly well and symmetrically. Tone is normal. Reflexes are 2+ and symmetrical. Plantars are down going. Gait is normal.  He has right much worse than the left cogwheel rigidity.     CBC:   Lab Results   Component Value Date/Time    WBC 12.3 10/02/2015 12:06 PM    RBC 4.83 10/02/2015 12:06 PM    HGB 12.4 (L) 10/17/2015 05:30 AM    HCT 37.2 10/17/2015 05:30 AM    PLATELET 429 72/69/8237 12:06 PM     BMP:   Lab Results   Component Value Date/Time    Glucose 99 10/17/2015 05:30 AM    Sodium 142 10/17/2015 05:30 AM    Potassium 4.2 10/17/2015 05:30 AM    Chloride 104 10/17/2015 05:30 AM    CO2 31 10/17/2015 05:30 AM    BUN 12 10/17/2015 05:30 AM    Creatinine 1.08 10/17/2015 05:30 AM    Calcium 8.1 (L) 10/17/2015 05:30 AM     CMP:   Lab Results   Component Value Date/Time    Glucose 99 10/17/2015 05:30 AM    Sodium 142 10/17/2015 05:30 AM    Potassium 4.2 10/17/2015 05:30 AM    Chloride 104 10/17/2015 05:30 AM    CO2 31 10/17/2015 05:30 AM    BUN 12 10/17/2015 05:30 AM    Creatinine 1.08 10/17/2015 05:30 AM Calcium 8.1 (L) 10/17/2015 05:30 AM    Anion gap 7 10/17/2015 05:30 AM    BUN/Creatinine ratio 11 (L) 10/17/2015 05:30 AM     Coagulation: No results found for: PTP, INR, APTT, PTTT    Assessment:  Probably has idiopathic Parkinson's disease in this man who has risk factors including exposure to neuroleptics in the past and currently. Little improvement with low dose Sinemet. Plan: Will start on low dose Mirapex and see him back here here in about 4 weeks. Sincerely,        Otilio Alfred.  Mane Brasher M.D.

## 2018-04-03 NOTE — COMMUNICATION BODY
Re:  Elder Cousin up visit     4/3/2018 2:58 PM      SSN: [id number]    Subjective:   Dearchris Casillas returns for follow up. He's not had any dramatic improvement in his gait. No side effect with the Sinemet. Medications:    Current Outpatient Prescriptions   Medication Sig Dispense Refill    carbidopa-levodopa (SINEMET)  mg per tablet Take 1 Tab by mouth three (3) times daily. 90 Tab 5    OLANZapine (ZYPREXA) 5 mg tablet TK 1 T PO QAM  3    omeprazole (PRILOSEC) 20 mg capsule TK 1 C PO QD 30 MIN AC  1    hydrOXYzine pamoate (VISTARIL) 50 mg capsule TK ONE C PO  Q 12 H PRA  2    benztropine (COGENTIN) 1 mg tablet TK 1 T PO Q 12 H  0    eszopiclone (LUNESTA) 3 mg tablet       traZODone (DESYREL) 50 mg tablet TK 1 TO 2 TS PO HS  0    VALBENAZINE TOSYLATE (INGREZZA PO) Take 80 mg by mouth daily.  cholecalciferol (VITAMIN D3) 1,000 unit tablet Take  by mouth daily.  ascorbic acid (VITAMIN C) 500 mg tablet Take 1,000 mg by mouth daily.  lisinopril-hydrochlorothiazide (ZESTORETIC) 10-12.5 mg per tablet Take  by mouth daily.          Vital signs:    Visit Vitals    /82 (BP 1 Location: Right arm, BP Patient Position: Sitting)    Pulse 80    Temp 97.3 °F (36.3 °C) (Oral)    Resp 16    Ht 5' 10\" (1.778 m)    Wt 79.4 kg (175 lb)    SpO2 93%    BMI 25.11 kg/m2       Review of Systems:   As above otherwise 11 point review of systems negative including;   Constitutional no fever or chills  Skin denies rash or itching  HEENT  Denies tinnitus, hearing lose  Eyes denies diplopia vision lose  Respiratory denies sortness of breath  Cardiovascular denies chest pain, dyspnea on exertion  Gastrointestinal denies nausea, vomiting, diarrhea, constipation  Genitourinary denies incontinence  Musculoskeletal denies joint pain or swelling  Endocrine denies weight change  Hematology denies easy bruising or bleeding   Neurological as above in HPI      Patient Active Problem List   Diagnosis Code    Hypertension I10    Glaucoma H40.9    Lumbar back pain M54.5    Osteopenia M85.80    Elevated PSA R97.20    Prostate cancer ([address] C61    Chest pain on exertion R07.9    Tobacco abuse counseling Z71.6    Cancer of prostate with high recurrence risk (stage T3a or Lou 8-10 or PSA > 20) C61    Erectile dysfunction following radical prostatectomy N52.31    Parkinson disease ([facility] [address] G20         Objective: The patient is awake, alert, and oriented x 4. Fund of knowledge is adequate. Speech is fluent and memory is intact. Cranial Nerves: II  Visual fields are full to confrontation. III, IV, VI  Extraocular movements are intact. There is no nystagmus. V  Facial sensation is intact to pinprick. VII  Face is symmetrical.  VIII - Hearing is present. IX, X, XII  Palate is symmetrical.   XI - Shoulder shrugging and head turning intact  Motor: The patient moves all four limbs fairly well and symmetrically. Tone is normal. Reflexes are 2+ and symmetrical. Plantars are down going. Gait is normal.  He has right much worse than the left cogwheel rigidity.     CBC:   Lab Results   Component Value Date/Time    WBC 12.3 10/02/2015 12:06 PM    RBC 4.83 10/02/2015 12:06 PM    HGB 12.4 (L) 10/17/2015 05:30 AM    HCT 37.2 10/17/2015 05:30 AM    PLATELET 581 33/52/8310 12:06 PM     BMP:   Lab Results   Component Value Date/Time    Glucose 99 10/17/2015 05:30 AM    Sodium 142 10/17/2015 05:30 AM    Potassium 4.2 10/17/2015 05:30 AM    Chloride 104 10/17/2015 05:30 AM    CO2 31 10/17/2015 05:30 AM    BUN 12 10/17/2015 05:30 AM    Creatinine 1.08 10/17/2015 05:30 AM    Calcium 8.1 (L) 10/17/2015 05:30 AM     CMP:   Lab Results   Component Value Date/Time    Glucose 99 10/17/2015 05:30 AM    Sodium 142 10/17/2015 05:30 AM    Potassium 4.2 10/17/2015 05:30 AM    Chloride 104 10/17/2015 05:30 AM    CO2 31 10/17/2015 05:30 AM    BUN 12 10/17/2015 05:30 AM    Creatinine 1.08 10/17/2015 05:30 AM Calcium 8.1 (L) 10/17/2015 05:30 AM    Anion gap 7 10/17/2015 05:30 AM    BUN/Creatinine ratio 11 (L) 10/17/2015 05:30 AM     Coagulation: No results found for: PTP, INR, APTT, PTTT    Assessment:  Probably has idiopathic Parkinson's disease in this man who has risk factors including exposure to neuroleptics in the past and currently. Little improvement with low dose Sinemet. Plan: Will start on low dose Mirapex and see him back here here in about 4 weeks. Sincerely,        Tom Holland.  Webster Litten, M.D.

## 2018-04-03 NOTE — LETTER
4/3/2018 3:01 PM 
 
Patient:  Marta Diaz YOB: 1955 Date of Visit: 4/3/2018 Dear Fermin Rider MD 
[address] [place] [address] VIA Facsimile: [phone] 
 : Thank you for referring Mr. Marta Diaz to me for evaluation/treatment. Below are the relevant portions of my assessment and plan of care. Re:  Lori Nohemy up visit     4/3/2018 2:58 PM 
 
 
SSN: [id number] Subjective:   Marta Diaz returns for follow up. He's not had any dramatic improvement in his gait. No side effect with the Sinemet. Medications:   
Current Outpatient Prescriptions Medication Sig Dispense Refill  carbidopa-levodopa (SINEMET)  mg per tablet Take 1 Tab by mouth three (3) times daily. 90 Tab 5  OLANZapine (ZYPREXA) 5 mg tablet TK 1 T PO QAM  3  
 omeprazole (PRILOSEC) 20 mg capsule TK 1 C PO QD 30 MIN AC  1  
 hydrOXYzine pamoate (VISTARIL) 50 mg capsule TK ONE C PO  Q 12 H PRA  2  
 benztropine (COGENTIN) 1 mg tablet TK 1 T PO Q 12 H  0  
 eszopiclone (LUNESTA) 3 mg tablet  traZODone (DESYREL) 50 mg tablet TK 1 TO 2 TS PO HS  0  
 VALBENAZINE TOSYLATE (INGREZZA PO) Take 80 mg by mouth daily.  cholecalciferol (VITAMIN D3) 1,000 unit tablet Take  by mouth daily.  ascorbic acid (VITAMIN C) 500 mg tablet Take 1,000 mg by mouth daily.  lisinopril-hydrochlorothiazide (ZESTORETIC) 10-12.5 mg per tablet Take  by mouth daily. Vital signs:   
Visit Vitals  /82 (BP 1 Location: Right arm, BP Patient Position: Sitting)  Pulse 80  Temp 97.3 °F (36.3 °C) (Oral)  Resp 16  
 Ht 5' 10\" (1.778 m)  Wt 79.4 kg (175 lb)  SpO2 93%  BMI 25.11 kg/m2 Review of Systems: As above otherwise 11 point review of systems negative including;  
Constitutional no fever or chills Skin denies rash or itching HEENT  Denies tinnitus, hearing lose Eyes denies diplopia vision lose Respiratory denies sortness of breath Cardiovascular denies chest pain, dyspnea on exertion Gastrointestinal denies nausea, vomiting, diarrhea, constipation Genitourinary denies incontinence Musculoskeletal denies joint pain or swelling Endocrine denies weight change Hematology denies easy bruising or bleeding Neurological as above in HPI Patient Active Problem List  
Diagnosis Code  Hypertension I10  
 Glaucoma H40.9  Lumbar back pain M54.5  Osteopenia M85.80  Elevated PSA R97.20  Prostate cancer ([facility] [address] [facility] C61  Chest pain on exertion R07.9  Tobacco abuse counseling Z71.6  Cancer of prostate with high recurrence risk (stage T3a or Lou 8-10 or PSA > 20) C61  Erectile dysfunction following radical prostatectomy N52.31  
 Parkinson disease ([facility] [address] [address] Objective: The patient is awake, alert, and oriented x 4. Fund of knowledge is adequate. Speech is fluent and memory is intact. Cranial Nerves: II  Visual fields are full to confrontation. III, IV, VI  Extraocular movements are intact. There is no nystagmus. V  Facial sensation is intact to pinprick. VII  Face is symmetrical.  VIII - Hearing is present. IX, X, XII  Palate is symmetrical.   XI - Shoulder shrugging and head turning intact Motor: The patient moves all four limbs fairly well and symmetrically. Tone is normal. Reflexes are 2+ and symmetrical. Plantars are down going. Gait is normal.  He has right much worse than the left cogwheel rigidity. CBC:  
Lab Results Component Value Date/Time WBC 12.3 10/02/2015 12:06 PM  
 RBC 4.83 10/02/2015 12:06 PM  
 HGB 12.4 (L) 10/17/2015 05:30 AM  
 HCT 37.2 10/17/2015 05:30 AM  
 PLATELET 480 65/55/4795 12:06 PM  
 
BMP:  
Lab Results Component Value Date/Time  Glucose 99 10/17/2015 05:30 AM  
 Sodium 142 10/17/2015 05:30 AM  
 Potassium 4.2 10/17/2015 05:30 AM  
 Chloride 104 10/17/2015 05:30 AM  
 CO2 31 10/17/2015 05:30 AM  
 BUN 12 10/17/2015 05:30 AM  
 Creatinine 1.08 10/17/2015 05:30 AM  
 Calcium 8.1 (L) 10/17/2015 05:30 AM  
 
CMP:  
Lab Results Component Value Date/Time Glucose 99 10/17/2015 05:30 AM  
 Sodium 142 10/17/2015 05:30 AM  
 Potassium 4.2 10/17/2015 05:30 AM  
 Chloride 104 10/17/2015 05:30 AM  
 CO2 31 10/17/2015 05:30 AM  
 BUN 12 10/17/2015 05:30 AM  
 Creatinine 1.08 10/17/2015 05:30 AM  
 Calcium 8.1 (L) 10/17/2015 05:30 AM  
 Anion gap 7 10/17/2015 05:30 AM  
 BUN/Creatinine ratio 11 (L) 10/17/2015 05:30 AM  
 
Coagulation: No results found for: PTP, INR, APTT, PTTT Assessment:  Probably has idiopathic Parkinson's disease in this man who has risk factors including exposure to neuroleptics in the past and currently. Little improvement with low dose Sinemet. Plan: Will start on low dose Mirapex and see him back here here in about 4 weeks. Sincerely, 
 
 
 
Viraj Cadena. Adonis Nuñez M.D. If you have questions, please do not hesitate to call me. I look forward to following Mr. Jackie Bhanu along with you.  
 
 
 
Sincerely, 
 
 
Ken Chatterjee MD

## 2018-04-03 NOTE — MR AVS SNAPSHOT
Karina Robles 
 
 
 [address] [facility] 
[phone] Patient: Hema Belcher MRN: [id number] LBL:[id number] Visit Information Date & Time Provider Department Dept. Phone Encounter #  
 4/3/2018  2:20 PM Kierra Crandall  [facility] at [address] [place] [address] [id number] Follow-up Instructions Return in about 4 weeks (around 5/1/2018). Follow-up and Disposition History Your Appointments 6/25/2018  1:00 PM  
Nurse Visit with Janae Sloan [facility] Appt Note: PSA  
 [address] [facility] [address] [address]  
  
    
 6/26/2018  1:40 PM  
Follow Up with Kierra Crandall MD  
[address] [facility] at [address] [place] [address] [facility]) Appt Note: 4 week fu  
 [address] [facility] [address] [facility] [address] [facility] [address] [address] [address] [address]  
  
    
  
 7/2/2018  1:00 PM  
Any with Aiden Lees MD  
[facility] ([facility]) Appt Note: 6 MTHS UA PSA PRIOR  
 [address] [facility] [address]  
  
   
 [address] [facility] [address] [address] [address] [address] Upcoming Health Maintenance Date Due Hepatitis C Screening 1955 DTaP/Tdap/Td series (1 - Tdap) 3/18/1974 FOBT Q 1 YEAR AGE 50-75 3/18/2003 ZOSTER VACCINE AGE 60> 1/18/2013 Influenza Age 5 to Adult 8/1/2017 GLAUCOMA SCREENING Q2Y 3/18/2018 Pneumococcal 65+ High/Highest Risk (1 of 2 - PCV13) 3/18/2018 Allergies as of 4/3/2018  Review Complete On: 4/3/2018 By: Chhaya Ibanez LPN Severity Noted Reaction Type Reactions Morphine  10/07/2015    Other (comments)  
 hallucination Current Immunizations  Never Reviewed Name Date Influenza Vaccine (Quad) PF 10/19/2015  4:34 PM  
  
 Not reviewed this visit You Were Diagnosed With   
  
 Codes Comments Parkinson disease ([facility]    -  Primary ICD-10-CM: G20 
ICD-9-CM: 332.0 Vitals BP Pulse Temp Resp Height(growth percentile) Weight(growth percentile) 118/82 (BP 1 Location: Right arm, BP Patient Position: Sitting) 80 97.3 °F (36.3 °C) (Oral) 16 5' 10\" (1.778 m) 175 lb (79.4 kg) SpO2 BMI Smoking Status 93% 25.11 kg/m2 Former Smoker Vitals History BMI and BSA Data Body Mass Index Body Surface Area  
 25.11 kg/m 2 1.98 m 2 Preferred Pharmacy Pharmacy Name Phone [phone] - [address], [address] [facility] [address] AT [address] [phone] Your Updated Medication List  
  
   
This list is accurate as of 4/3/18  3:04 PM.  Always use your most recent med list.  
  
  
  
  
 benztropine 1 mg tablet Commonly known as:  COGENTIN  
TK 1 T PO Q 12 H  
  
 carbidopa-levodopa  mg per tablet Commonly known as:  SINEMET Take 1 Tab by mouth three (3) times daily. eszopiclone 3 mg tablet Commonly known as:  LUNESTA  
  
 hydrOXYzine pamoate 50 mg capsule Commonly known as:  VISTARIL TK ONE C PO  Q 12 H PRA INGREZZA PO Take 80 mg by mouth daily. OLANZapine 5 mg tablet Commonly known as:  ZyPREXA TK 1 T PO QAM  
  
 omeprazole 20 mg capsule Commonly known as:  PRILOSEC TK 1 C PO QD 30 MIN AC  
  
 pramipexole 0.25 mg tablet Commonly known as:  MIRAPEX Take 1 Tab by mouth three (3) times daily. traZODone 50 mg tablet Commonly known as:  DESYREL  
TK 1 TO 2 TS PO HS  
  
 VITAMIN C 500 mg tablet Generic drug:  ascorbic acid (vitamin C) Take 1,000 mg by mouth daily. VITAMIN D3 1,000 unit tablet Generic drug:  cholecalciferol Take  by mouth daily. ZESTORETIC 10-12.5 mg per tablet Generic drug:  lisinopril-hydroCHLOROthiazide Take  by mouth daily. Prescriptions Sent to Pharmacy Refills  
 pramipexole (MIRAPEX) 0.25 mg tablet 6 Sig: Take 1 Tab by mouth three (3) times daily. Class: Normal  
 Pharmacy: [facility] [address] [place] [address] AT [address] Ph #: [phone] Route: Oral  
  
Follow-up Instructions Return in about 4 weeks (around 5/1/2018). Introducing [facility] SERVICES! Dear Buck Richard: Thank you for requesting a [employer] account. Our records indicate that you already have an active [employer]History account. You can access your account anytime at [email or web address] Did you know that you can access your hospital and ER discharge instructions at any time in [employer]History? You can also review all of your test results from your hospital stay or ER visit. Additional Information If you have questions, please visit the Frequently Asked Questions section of the PureHistory website at [email or web address] Remember, PureHistory is NOT to be used for urgent needs. For medical emergencies, dial 911. Now available from your iPhone and Android! Please provide this summary of care documentation to your next provider. Your primary care clinician is listed as Bert Vazquez. If you have any questions after today's visit, please call [phone].

## 2018-08-24 ENCOUNTER — OFFICE VISIT (OUTPATIENT)
Dept: NEUROLOGY | Age: 63
End: 2018-08-24

## 2018-08-24 VITALS
BODY MASS INDEX: 24.74 KG/M2 | WEIGHT: 172.8 LBS | HEIGHT: 70 IN | OXYGEN SATURATION: 94 % | RESPIRATION RATE: 16 BRPM | TEMPERATURE: 97.7 F | HEART RATE: 83 BPM | SYSTOLIC BLOOD PRESSURE: 132 MMHG | DIASTOLIC BLOOD PRESSURE: 72 MMHG

## 2018-08-24 DIAGNOSIS — G21.11 NEUROLEPTIC-INDUCED PARKINSONISM (HCC): ICD-10-CM

## 2018-08-24 DIAGNOSIS — G20 PARKINSON DISEASE (HCC): Primary | ICD-10-CM

## 2018-08-24 NOTE — MR AVS SNAPSHOT
[address] [address] 
[phone] Patient: Kalie Sherman MRN: [id number] QDL:8/85/0151 Visit Information Date & Time Provider Department Dept. Phone Encounter #  
 8/24/2018 10:40 AM Angel Luis Costa MD [facility] [phone] [id number] Follow-up Instructions Return in about 3 weeks (around 9/14/2018). Follow-up and Disposition History Your Appointments 9/13/2018 11:00 PM  
Follow Up with Angel Luis Costa MD  
[facility] at [address] [facility]) Appt Note: 3wk f/u  
 801 [facility]2 [facility] 129 [address] [facility] 630 [facility] B-2 [address] [address] [address]  
  
    
 12/11/2018 11:30 AM  
Nurse Visit with Ariel Rubalcava [facility] Appt Note: PSA prior 2057 [facility] 200 [facility] 1097 [facility]  
  
   
 200 [facility]  
  
    
  
 1/9/2019  1:00 PM  
Any with Loyd Sheikh MD  
[facility] Appt Note: Return in about 6 months (around 1/9/2019) for UA, PSA prior. 2057 [facility] 200 [address] [address] 1097 [facility]  
  
   
 2057 [facility] 2301 [facility] 100 200 [facility] Upcoming Health Maintenance Date Due Hepatitis C Screening 1955 DTaP/Tdap/Td series (1 - Tdap) 3/18/1974 FOBT Q 1 YEAR AGE 50-75 3/18/2003 ZOSTER VACCINE AGE 60> 1/18/2013 GLAUCOMA SCREENING Q2Y 3/18/2018 Pneumococcal 65+ High/Highest Risk (1 of 2 - PCV13) 3/18/2018 MEDICARE YEARLY EXAM 4/3/2018 Influenza Age 5 to Adult 8/1/2018 Allergies as of 8/24/2018  Review Complete On: 8/24/2018 By: Javier Gutiérrez LPN Severity Noted Reaction Type Reactions Morphine  10/07/2015    Other (comments)  
 hallucination Current Immunizations  Never Reviewed Name Date Influenza Vaccine (Quad) PF 10/19/2015  4:34 PM  
  
 Not reviewed this visit You Were Diagnosed With   
  
 Codes Comments Parkinson disease ([facility]    -  Primary ICD-10-CM: G20 
ICD-9-CM: 332.0 Neuroleptic-induced Parkinsonism ([facility].)     ICD-10-CM: G21.11 ICD-9-CM: 332.1, B588.1 Vitals BP Pulse Temp Resp Height(growth percentile) Weight(growth percentile) 132/72 (BP 1 Location: Right arm, BP Patient Position: Sitting) 83 97.7 °F (36.5 °C) (Oral) 16 5' 10\" (1.778 m) 172 lb 12.8 oz (78.4 kg) SpO2 BMI Smoking Status 94% 24.79 kg/m2 Former Smoker Vitals History BMI and BSA Data Body Mass Index Body Surface Area 24.79 kg/m 2 1.97 m 2 Preferred Pharmacy Pharmacy Name Phone [phone], [address] [phone] Your Updated Medication List  
  
   
This list is accurate as of 8/24/18 11:17 AM.  Always use your most recent med list.  
  
  
  
  
 benztropine 1 mg tablet Commonly known as:  COGENTIN  
TK 1 T PO Q 12 H  
  
 carbidopa-levodopa  mg per tablet Commonly known as:  SINEMET Take 1 Tab by mouth three (3) times daily. eszopiclone 3 mg tablet Commonly known as:  LUNESTA  
  
 hydrOXYzine pamoate 50 mg capsule Commonly known as:  VISTARIL TK ONE C PO  Q 12 H PRA INGREZZA PO Take 80 mg by mouth daily. OLANZapine 5 mg tablet Commonly known as:  ZyPREXA TK 1 T PO QAM  
  
 omeprazole 20 mg capsule Commonly known as:  PRILOSEC TK 1 C PO QD 30 MIN AC  
  
 pramipexole 0.25 mg tablet Commonly known as:  MIRAPEX Take 1 Tab by mouth three (3) times daily. traZODone 50 mg tablet Commonly known as:  DESYREL  
TK 1 TO 2 TS PO HS  
  
 VITAMIN C 500 mg tablet Generic drug:  ascorbic acid (vitamin C) Take 1,000 mg by mouth daily. VITAMIN D3 1,000 unit tablet Generic drug:  cholecalciferol Take  by mouth daily. ZESTORETIC 10-12.5 mg per tablet Generic drug:  lisinopril-hydroCHLOROthiazide Take  by mouth daily. Follow-up Instructions Return in about 3 weeks (around 9/14/2018). Introducing [facility]! Dear Joshua Vega: Thank you for requesting a [employer] work account. Our records indicate that you already have an active WE[employer] at work account. You can access your account anytime at [email or web address] Did you know that you can access your hospital and ER discharge instructions at any time in WE[employer] at work? You can also review all of your test results from your hospital stay or ER visit. Additional Information If you have questions, please visit the Frequently Asked Questions section of the [employer] work website at [email or web address] Remember, WE[employer] at work is NOT to be used for urgent needs. For medical emergencies, dial 911. Now available from your iPhone and Android! Please provide this summary of care documentation to your next provider. Your primary care clinician is listed as Rina Mcneil. If you have any questions after today's visit, please call [phone].

## 2018-08-24 NOTE — PROGRESS NOTES
Re:  Uma Elizalde up visit     8/24/2018 11:01 AM    SSN: [id number]    Subjective:   Tierra Urias returns for follow up. He's not had any dramatic improvement in his gait. No side effect with the Sinemet. The addition of Mirapex did little for him. Medications:    Current Outpatient Prescriptions   Medication Sig Dispense Refill    pramipexole (MIRAPEX) 0.25 mg tablet Take 1 Tab by mouth three (3) times daily. 90 Tab 6    carbidopa-levodopa (SINEMET)  mg per tablet Take 1 Tab by mouth three (3) times daily. 90 Tab 5    OLANZapine (ZYPREXA) 5 mg tablet TK 1 T PO QAM  3    omeprazole (PRILOSEC) 20 mg capsule TK 1 C PO QD 30 MIN AC  1    hydrOXYzine pamoate (VISTARIL) 50 mg capsule TK ONE C PO  Q 12 H PRA  2    benztropine (COGENTIN) 1 mg tablet TK 1 T PO Q 12 H  0    eszopiclone (LUNESTA) 3 mg tablet       traZODone (DESYREL) 50 mg tablet TK 1 TO 2 TS PO HS  0    VALBENAZINE TOSYLATE (INGREZZA PO) Take 80 mg by mouth daily.  cholecalciferol (VITAMIN D3) 1,000 unit tablet Take  by mouth daily.  ascorbic acid (VITAMIN C) 500 mg tablet Take 1,000 mg by mouth daily.  lisinopril-hydrochlorothiazide (ZESTORETIC) 10-12.5 mg per tablet Take  by mouth daily.          Vital signs:    Visit Vitals    /72 (BP 1 Location: Right arm, BP Patient Position: Sitting)    Pulse 83    Temp 97.7 °F (36.5 °C) (Oral)    Resp 16    Ht 5' 10\" (1.778 m)    Wt 78.4 kg (172 lb 12.8 oz)    SpO2 94%    BMI 24.79 kg/m2       Review of Systems:   As above otherwise 11 point review of systems negative including;   Constitutional no fever or chills  Skin denies rash or itching  HEENT  Denies tinnitus, hearing lose  Eyes denies diplopia vision lose  Respiratory denies sortness of breath  Cardiovascular denies chest pain, dyspnea on exertion  Gastrointestinal denies nausea, vomiting, diarrhea, constipation  Genitourinary denies incontinence  Musculoskeletal denies joint pain or swelling  Endocrine denies weight change  Hematology denies easy bruising or bleeding   Neurological as above in HPI      Patient Active Problem List   Diagnosis Code    Hypertension I10    Glaucoma H40.9    Lumbar back pain M54.5    Osteopenia M85.80    Elevated PSA R97.20    Prostate cancer ([facility] C61    Chest pain on exertion R07.9    Tobacco abuse counseling Z71.6    Malignant neoplasm of prostate ([facility] C61    Erectile dysfunction following radical prostatectomy N52.31    Parkinson disease ([facility] G20         Objective: The patient is awake, alert, and oriented x 4. Fund of knowledge is adequate. Speech is fluent and memory is intact. Cranial Nerves: II - Visual fields are full to confrontation. III, IV, VI - Extraocular movements are intact. There is no nystagmus. V - Facial sensation is intact to pinprick. VII - Face is symmetrical.  VIII - Hearing is present. IX, X, XII - Palate is symmetrical.   XI - Shoulder shrugging and head turning intact  Motor: The patient moves all four limbs fairly well and symmetrically. Tone is normal. Reflexes are 2+ and symmetrical. Plantars are down going. Gait is abnormal, he has festinating gait. He has right much worse than the left cogwheel rigidity.     CBC:   Lab Results   Component Value Date/Time    WBC 12.3 10/02/2015 12:06 PM    RBC 4.83 10/02/2015 12:06 PM    HGB 12.4 (L) 10/17/2015 05:30 AM    HCT 37.2 10/17/2015 05:30 AM    PLATELET 166 99/00/5892 12:06 PM     BMP:   Lab Results   Component Value Date/Time    Glucose 99 10/17/2015 05:30 AM    Sodium 142 10/17/2015 05:30 AM    Potassium 4.2 10/17/2015 05:30 AM    Chloride 104 10/17/2015 05:30 AM    CO2 31 10/17/2015 05:30 AM    BUN 12 10/17/2015 05:30 AM    Creatinine 1.08 10/17/2015 05:30 AM    Calcium 8.1 (L) 10/17/2015 05:30 AM     CMP:   Lab Results   Component Value Date/Time    Glucose 99 10/17/2015 05:30 AM    Sodium 142 10/17/2015 05:30 AM    Potassium 4.2 10/17/2015 05:30 AM Chloride 104 10/17/2015 05:30 AM    CO2 31 10/17/2015 05:30 AM    BUN 12 10/17/2015 05:30 AM    Creatinine 1.08 10/17/2015 05:30 AM    Calcium 8.1 (L) 10/17/2015 05:30 AM    Anion gap 7 10/17/2015 05:30 AM    BUN/Creatinine ratio 11 (L) 10/17/2015 05:30 AM     Coagulation: No results found for: PTP, INR, APTT, PTTT    Assessment:  Probably has idiopathic Parkinson's disease in this man who has risk factors including exposure to neuroleptics in the past and currently. Little improvement with low dose Sinemet and Mirapex. Plan: Will not switch the medications at this time since he's going to see his psychiatrist in one week. I'd like to see him in about 3 weeks and at that time will increase the medications if he's not better. Sincerely,        Santo Amaya.  Sweta Ewing M.D.

## 2018-08-24 NOTE — PROGRESS NOTES
Chief Complaint   Patient presents with    Follow-up    Neurologic Problem     Parkinson's     1. Have you been to the ER, urgent care clinic since your last visit? Hospitalized since your last visit? No    2. Have you seen or consulted any other health care providers outside of the [facility] since your last visit? Include any pap smears or colon screening.  No

## 2018-09-18 ENCOUNTER — HOSPITAL ENCOUNTER (OUTPATIENT)
Dept: PHYSICAL THERAPY | Age: 63
Discharge: HOME | End: 2018-09-18
Payer: MEDICARE

## 2018-09-18 PROCEDURE — 97112 NEUROMUSCULAR REEDUCATION: CPT

## 2018-09-18 PROCEDURE — 97110 THERAPEUTIC EXERCISES: CPT

## 2018-09-18 PROCEDURE — 97162 PT EVAL MOD COMPLEX 30 MIN: CPT

## 2018-09-18 PROCEDURE — G8982 BODY POS GOAL STATUS: HCPCS

## 2018-09-18 PROCEDURE — G8981 BODY POS CURRENT STATUS: HCPCS

## 2018-09-18 NOTE — PROGRESS NOTES
In [address]  [address] [place] [address] [place], [address] 
[phone] fax Plan of Care/ Statement of Necessity for Physical Therapy Services Patient name: Wilda Knight Start of Care: 2018 Referral source: Leta Hill MD : 1955 Medical Diagnosis: Unsteadiness on feet [R26.81] Onset Date:1.5 years ago Treatment Diagnosis: Balance/falls risk Prior Hospitalization: see medical history Provider#: [id number] Medications: Verified on Patient summary List  
 Comorbidities: HTN, lumbar laminectomy, sleep dysfunction Prior Level of Function: The patient reports he was able to balance better about 1.5 years ago. The Plan of Care and following information is based on the information from the initial evaluation. Assessment/ key information: The patient is a 70year old male with a chief complaint of imbalance as well as generalized weakness. The patient states that he has had difficulty walking with a shuffling gait as well as a tendency of losing his balance. He indicates that his balance has become less effective with it over the past year or so. He denies any diagnostics up to this point despite minor right sided weakness. The patient has signs and symptoms that may be consistent with parkinson's, but does have a slight falls risk with related impairments consisting of pain, decreased ROM, decreased strength, decreased balance, limited ADL ease, and decreased in community ambulation efficiency. The patient will benefit from skilled PT in order to address the aforementioned impairments.  
 
Evaluation Complexity History MEDIUM  Complexity : 1-2 comorbidities / personal factors will impact the outcome/ POC ; Examination MEDIUM Complexity : 3 Standardized tests and measures addressing body structure, function, activity limitation and / or participation in recreation ;Presentation MEDIUM Complexity : Evolving with changing characteristics  ; Clinical Decision Making MEDIUM Complexity : FOTO score of 26-74 Overall Complexity Rating: MEDIUM Problem List: pain affecting function, decrease ROM, decrease strength, impaired gait/ balance, decrease ADL/ functional abilitiies, decrease activity tolerance, decrease flexibility/ joint mobility and decrease transfer abilities Treatment Plan may include any combination of the following: Therapeutic exercise, Therapeutic activities, Neuromuscular re-education, Physical agent/modality, Gait/balance training, Manual therapy, Patient education and Functional mobility training Patient / Family readiness to learn indicated by: asking questions, trying to perform skills and interest 
Persons(s) to be included in education: patient (P); patient's wife. Barriers to Learning/Limitations: None Patient Goal (s): more strength Patient Self Reported Health Status: poor Rehabilitation Potential: good Short Term Goals: To be accomplished in 2 weeks: 1. The patient will be independent and compliant with HEP to maximize therapeutic benefit. 2. The patient will improve TUG score to 8 seconds to improve community ambulation. Long Term Goals: To be accomplished in 4 weeks: 1. The patient will improve FOTO score to 52 to maximize quality of life. 2. The patient will improve DGI score to 22/24 to reduce falls risk. 3. The patient will improve modified tandem stance on airex to 30\" to improve ease of ADLs. 4. The patient will display horizontal head turns while ambulating with no deviation to maximize ease of ambulation. Frequency / Duration: Patient to be seen 2 times per week for 4 weeks. Patient/ Caregiver education and instruction: Diagnosis, prognosis, self care, activity modification and exercises 
 [x]  Plan of care has been reviewed with EDIE 
 
G-Kimi (GP) Position  Current  CL= 60-79%  Goal  CK= 40-59% The severity rating is based on clinical judgment and the FOTO score. Certification Period: 9/18/2018 - 11/18/2018 Parish Solorzano, PT 9/18/2018 2:59 PM 
 
________________________________________________________________________ I certify that the above Therapy Services are being furnished while the patient is under my care. I agree with the treatment plan and certify that this therapy is necessary. [de-identified] Signature:____________________  Date:____________Time: _________ Please sign and return to In [address] [place], [address] 
[phone] fax

## 2018-09-18 NOTE — PROGRESS NOTES
PT DAILY TREATMENT NOTE - [facility]  Patient Name: Giancarlo Schneider Date:2018 : 1955 [x]  Patient  Verified Payor: [place] / Plan: Larry Mood / Product Type: Medicare / In time:1:55  Out time:2:55 Total Treatment Time (min): 60 Total Timed Codes (min): 60 
1:1 Treatment Time ( only): 32 Visit #: 1 of 8 Treatment Area: Unsteadiness on feet [R26.81] SUBJECTIVE Pain Level (0-10 scale): 0/10 Any medication changes, allergies to medications, adverse drug reactions, diagnosis change, or new procedure performed?: [x] No    [] Yes (see summary sheet for update) Subjective functional status/changes:   [] No changes reported The patient has a chief complaint of unsteadiness and lack of balance as well a decreased strength. OBJECTIVE 28 min [x]Eval                  []Re-Eval  
 
 
10 min Therapeutic Exercise:  [] See flow sheet :  
Rationale: increase ROM and increase strength to improve the patients ability to improve ADL ease. 22 min Neuromuscular Re-education:  []  See flow sheet :  
Rationale: improve coordination, improve balance and increase proprioception  to improve the patients ability to improve ADL ease. With 
 [] TE 
 [] TA 
 [] neuro 
 [] other: Patient Education: [x] Review HEP [] Progressed/Changed HEP based on:  
[] positioning   [] body mechanics   [] transfers   [] heat/ice application   
[] other:   
 
Other Objective/Functional Measures: See IE Pain Level (0-10 scale) post treatment: 0/10 ASSESSMENT/Changes in Function: See POC.  
 
Patient will continue to benefit from skilled PT services to modify and progress therapeutic interventions, address functional mobility deficits, address ROM deficits, address strength deficits, analyze and address soft tissue restrictions, analyze and cue movement patterns, analyze and modify body mechanics/ergonomics, assess and modify postural abnormalities and instruct in home and community integration to attain remaining goals. [x]  See Plan of Care 
[]  See progress note/recertification 
[]  See Discharge Summary Progress towards goals / Updated goals: 
Short Term Goals: To be accomplished in 2 weeks: 1. The patient will be independent and compliant with HEP to maximize therapeutic benefit. 2. The patient will improve TUG score to 8 seconds to improve community ambulation. Long Term Goals: To be accomplished in 4 weeks: 1. The patient will improve FOTO score to 52 to maximize quality of life. 2. The patient will improve DGI score to 22/24 to reduce falls risk. 3. The patient will improve modified tandem stance on airex to 30\" to improve ease of ADLs. 4. The patient will display horizontal head turns while ambulating with no deviation to maximize ease of ambulation. PLAN 
[]  Upgrade activities as tolerated     [x]  Continue plan of care 
[]  Update interventions per flow sheet      
[]  Discharge due to:_ 
[]  Other:_   
 
Rodríguez Alegria, PT 9/18/2018  5:39 PM 
 
Future Appointments Date Time Provider Khoa Gupta 9/21/2018 9:30 AM Dina Lee, PTA [facility]  
10/11/2018 2:00 [facility]  
10/17/2018 12:00 PM Melanie Irvine, PTA [facility]  
10/19/2018 12:00 PM Melanie Irvine, PTA [facility]  
10/22/2018 1:00 PM Melanie Irvine, PTA [facility]  
10/24/2018 12:00 PM Melanie Irvine, PTA [facility]  
11/5/2018 12:00 PM Rodríguez Alegria, PT [facility]  
12/11/2018 11:30 AM [facility] NURSE [facility] JANNA BOSCH  
1/9/2019 1:00 PM MD Jurgen Hurley

## 2018-09-21 ENCOUNTER — HOSPITAL ENCOUNTER (OUTPATIENT)
Dept: PHYSICAL THERAPY | Age: 63
Discharge: HOME | End: 2018-09-21
Payer: MEDICARE

## 2018-09-21 PROCEDURE — 97112 NEUROMUSCULAR REEDUCATION: CPT

## 2018-09-21 PROCEDURE — 97110 THERAPEUTIC EXERCISES: CPT

## 2018-09-21 NOTE — PROGRESS NOTES
PT DAILY TREATMENT NOTE - [facility]  Patient Name: Wilda Knight Date:2018 : 1955 [x]  Patient  Verified Payor: [place] / Plan: Graciela Lowe [facility] / Product Type: Medicare / In time:9:32  Out time:10:13 Total Treatment Time (min): 41 Total Timed Codes (min): 41 
1:1 Treatment Time ( [address] only): 41 Visit #: 2 of 8 Treatment Area: Unsteadiness on feet [R26.81] SUBJECTIVE Pain Level (0-10 scale): 0 Any medication changes, allergies to medications, adverse drug reactions, diagnosis change, or new procedure performed?: [x] No    [] Yes (see summary sheet for update) Subjective functional status/changes:   [] No changes reported Pt reports sleeping a little better last night, has been doing homework and feels a little ms soreness OBJECTIVE 31 min Therapeutic Exercise:  [x] See flow sheet :  
Rationale: increase ROM, increase strength and improve balance to improve the patients ability to perform ADLs 10 min Neuromuscular Re-education:  [x]  See flow sheet :  
Rationale: improve coordination, improve balance and increase proprioception  to improve the patients ability to perform daily tasks With 
 [] TE 
 [] TA 
 [] neuro 
 [] other: Patient Education: [x] Review HEP [] Progressed/Changed HEP based on:  
[] positioning   [] body mechanics   [] transfers   [] heat/ice application   
[] other:   
 
Other Objective/Functional Measures:  
Initiated POC per flowsheet ALT marches performed with fingertip touch for balance Noted min LOB with quick turns and pivots during amb Pain Level (0-10 scale) post treatment: 0 
 
ASSESSMENT/Changes in Function: Initiated POC per flowsheet. Pt challenged well with therex, able to perform standing ex's with 2 finger touch for balance. Noted several self-corrected LOB while making quick turns and pivots with dynamic amb activities.  Noted sway while amb with HT, no therapist assist required to regain balance throughout treatment. Patient will continue to benefit from skilled PT services to modify and progress therapeutic interventions, address functional mobility deficits, address strength deficits, analyze and cue movement patterns, analyze and modify body mechanics/ergonomics and address imbalance/dizziness to attain remaining goals. []  See Plan of Care 
[]  See progress note/recertification 
[]  See Discharge Summary Progress towards goals / Updated goals: 
Short Term Goals: To be accomplished in 2 weeks: 1. The patient will be independent and compliant with HEP to maximize therapeutic benefit. MET- pt reports compliance 2. The patient will improve TUG score to 8 seconds to improve community ambulation. Long Term Goals: To be accomplished in 4 weeks: 1. The patient will improve FOTO score to 52 to maximize quality of life. 2. The patient will improve DGI score to 22/24 to reduce falls risk. 3. The patient will improve modified tandem stance on airex to 30\" to improve ease of ADLs. 4. The patient will display horizontal head turns while ambulating with no deviation to maximize ease of ambulation. PLAN [x]  Upgrade activities as tolerated     [x]  Continue plan of care 
[]  Update interventions per flow sheet      
[]  Discharge due to:_ 
[]  Other:_ Jax Johnson, EDIE 9/21/2018  9:21 AM 
 
Future Appointments Date Time Provider Khoa Gupta 9/21/2018 9:30 AM [facility]  
10/11/2018 2:00 PM Martin Ca[facility]  
10/17/2018 12:00 PM [facility]  
10/19/2018 12:00 PM [facility]  
10/22/2018 1:00 PM [facility]  
10/24/2018 12:00 PM Martin Ca, PTA [facility]  
 11/5/2018 12:00 PM Tulio Stovall, PT [facility]  
12/11/2018 11:30 AM [facility] NURSE [facility] JANNA [facility]  
1/9/2019 1:00 PM MD Jurgen Hurtado

## 2018-10-11 ENCOUNTER — HOSPITAL ENCOUNTER (OUTPATIENT)
Dept: PHYSICAL THERAPY | Age: 63
Discharge: HOME | End: 2018-10-11
Payer: MEDICARE

## 2018-10-11 PROCEDURE — 97112 NEUROMUSCULAR REEDUCATION: CPT

## 2018-10-11 PROCEDURE — 97110 THERAPEUTIC EXERCISES: CPT

## 2018-10-11 NOTE — PROGRESS NOTES
PT DAILY TREATMENT NOTE - [facility]  Patient Name: Adenike Lewis Date:10/11/2018 : 1955 [x]  Patient  Verified Payor: [place] MED[place]ARE / Plan: Graciela Vaughngalina / Product Type: Medicare / In time:2:00  Out time:2:37 Total Treatment Time (min): 37 Total Timed Codes (min): 37 
1:1 Treatment Time ( only): 37 Visit #: 3 of 8 Treatment Area: Unsteadiness on feet [R26.81] SUBJECTIVE Pain Level (0-10 scale): 0/10 Any medication changes, allergies to medications, adverse drug reactions, diagnosis change, or new procedure performed?: [x] No    [] Yes (see summary sheet for update) Subjective functional status/changes:   [] No changes reported Pt denies any pain currently. OBJECTIVE 27 min Therapeutic Exercise:  [x] See flow sheet :  
Rationale: increase ROM and increase strength to improve the patients ability to tolerate ADLs. 10 min Neuromuscular Re-education:  [x]  See flow sheet :  
Rationale: increase ROM and increase strength  to improve the patients ability to tolerate ADLs. With 
 [] TE 
 [] TA 
 [] neuro 
 [] other: Patient Education: [x] Review HEP [] Progressed/Changed HEP based on:  
[] positioning   [] body mechanics   [] transfers   [] heat/ice application   
[] other:   
 
Other Objective/Functional Measures: added step taps Pain Level (0-10 scale) post treatment: 0/10 ASSESSMENT/Changes in Function: Pt demonstrates good ability to self correct LOB to right side. Patient will continue to benefit from skilled PT services to modify and progress therapeutic interventions, address functional mobility deficits, address ROM deficits, address strength deficits, analyze and address soft tissue restrictions and analyze and cue movement patterns to attain remaining goals. []  See Plan of Care 
[]  See progress note/recertification 
[]  See Discharge Summary Progress towards goals / Updated goals: Short Term Goals: To be accomplished in 2 weeks: 
                      8. The patient will be independent and compliant with HEP to maximize therapeutic benefit.               
MET- pt reports compliance 
                      2. The patient will improve TUG score to 8 seconds to improve community ambulation. Long Term Goals: To be accomplished in 4 weeks: 
                      8. The patient will improve FOTO score to 52 to maximize quality of life. 
                      2. The patient will improve DGI score to 22/24 to reduce falls risk.             
                      3. The patient will improve modified tandem stance on airex to 30\" to improve ease of ADLs.                       5. The patient will display horizontal head turns while ambulating with no deviation to maximize ease of ambulation. PLAN 
[]  Upgrade activities as tolerated     [x]  Continue plan of care 
[]  Update interventions per flow sheet      
[]  Discharge due to:_ 
[]  Other:_ Obi Moore PTA 10/11/2018  2:16 PM 
 
Future Appointments Date Time Provider Khoa Gupta 10/17/2018 12:00 PM Obi Moore PTA [facility]  
10/19/2018 12:00 PM Obi Moore PTA [facility]  
10/22/2018 1:00 PM Obi Moore PTA [facility]  
10/24/2018 12:00 PM Obi Moore PTA [facility]  
11/5/2018 12:00 PM Rudy Lucia PT [facility]  
12/11/2018 11:30 AM [facility] NURSE HERMANN BOSCH  
1/9/2019 1:00 PM MD Jurgen Viveros

## 2018-10-17 ENCOUNTER — HOSPITAL ENCOUNTER (OUTPATIENT)
Dept: PHYSICAL THERAPY | Age: 63
Discharge: HOME | End: 2018-10-17
Payer: MEDICARE

## 2018-10-17 PROCEDURE — 97110 THERAPEUTIC EXERCISES: CPT

## 2018-10-17 PROCEDURE — 97112 NEUROMUSCULAR REEDUCATION: CPT

## 2018-10-17 NOTE — PROGRESS NOTES
PT DAILY TREATMENT NOTE - [facility]  Patient Name: Deb Coburn Date:10/17/2018 : 1955 [x]  Patient  Verified Payor: [place] MEDICARE / Plan: Graciela Lowe [facility] / Product Type: Medicare / In time:12:00  Out time:12:41 Total Treatment Time (min): 41 Total Timed Codes (min): 41 
1:1 Treatment Time ( only): 31 Visit #: 4 of 8 Treatment Area: Unsteadiness on feet [R26.81] SUBJECTIVE Pain Level (0-10 scale): 0/10 Any medication changes, allergies to medications, adverse drug reactions, diagnosis change, or new procedure performed?: [x] No    [] Yes (see summary sheet for update) Subjective functional status/changes:   [] No changes reported Pt reports he is being tested for parkinson's disease next week. OBJECTIVE 31 min Therapeutic Exercise:  [x] See flow sheet :  
Rationale: increase ROM and increase strength to improve the patients ability to tolerate ADLs. 10 min Neuromuscular Re-education:  [x]  See flow sheet :  
Rationale: increase strength, improve coordination and increase proprioception  to improve the patients ability to perform functional activities. With 
 [] TE 
 [] TA 
 [] neuro 
 [] other: Patient Education: [x] Review HEP [] Progressed/Changed HEP based on:  
[] positioning   [] body mechanics   [] transfers   [] heat/ice application   
[] other:   
 
Other Objective/Functional Measures: minor LOB with balance activities and gait training. Pain Level (0-10 scale) post treatment: 0/10 ASSESSMENT/Changes in Function: Pt demonstrates slight gait deviations but no significant LOB noted with gait training.   
 
Patient will continue to benefit from skilled PT services to modify and progress therapeutic interventions, address functional mobility deficits, address ROM deficits, address strength deficits, analyze and address soft tissue restrictions, analyze and cue movement patterns and analyze and modify body mechanics/ergonomics to attain remaining goals. []  See Plan of Care 
[]  See progress note/recertification 
[]  See Discharge Summary Progress towards goals / Updated goals: 
Short Term Goals: To be accomplished in 2 weeks: 
                      0. The patient will be independent and compliant with HEP to maximize therapeutic benefit.               
MET- pt reports compliance 
                      2. The patient will improve TUG score to 8 seconds to improve community ambulation. Long Term Goals: To be accomplished in 4 weeks: 
                      5. The patient will improve FOTO score to 52 to maximize quality of life. 
                      2. The patient will improve DGI score to 22/24 to reduce falls risk.             
                      3. The patient will improve modified tandem stance on airex to 30\" to improve ease of ADLs. - Progressing; slight LOB noted. 10/17/18 
                      4. The patient will display horizontal head turns while ambulating with no deviation to maximize ease of ambulation. PLAN 
[]  Upgrade activities as tolerated     [x]  Continue plan of care 
[]  Update interventions per flow sheet      
[]  Discharge due to:_ 
[]  Other:_ Stef Whatley PTA 10/17/2018  12:46 PM 
 
Future Appointments Date Time Provider Khoa Gupta 10/19/2018 12:00 PM Sally Barakat, PTA [facility]  
10/22/2018  1:00 PM Sally Barakat PTA [facility]  
10/24/2018 12:00 PM Sally Barakat, PTA [facility]  
11/5/2018 12:00 PM Sharon Beckwith PT [facility]  
12/11/2018 11:30 AM [facility] NURSE [address] [place] [address]  
1/9/2019  1:00 PM Abram Velasquez MD [address] [place] [address]

## 2018-10-19 ENCOUNTER — HOSPITAL ENCOUNTER (OUTPATIENT)
Dept: PHYSICAL THERAPY | Age: 63
Discharge: HOME | End: 2018-10-19
Payer: MEDICARE

## 2018-10-19 PROCEDURE — G8982 BODY POS GOAL STATUS: HCPCS

## 2018-10-19 PROCEDURE — G8983 BODY POS D/C STATUS: HCPCS

## 2018-10-19 PROCEDURE — 97110 THERAPEUTIC EXERCISES: CPT

## 2018-10-19 PROCEDURE — 97116 GAIT TRAINING THERAPY: CPT

## 2018-10-19 PROCEDURE — 97112 NEUROMUSCULAR REEDUCATION: CPT

## 2018-10-19 NOTE — PROGRESS NOTES
PT DAILY TREATMENT NOTE 10-18 Patient Name: Son Alcala Date:10/19/2018 : 1955 [x]  Patient  Verified Payor: [place] MEDICARE / Plan: Graciela Zhang / Product Type: Medicare / In time:12:00  Out time:12:40 Total Treatment Time (min): 40 Visit #: 5 of 8 Medicare/BCBS Only Total Timed Codes (min):  40 1:1 Treatment Time:  40 Treatment Area: Unsteadiness on feet [R26.81] SUBJECTIVE Pain Level (0-10 scale): 0/10 Any medication changes, allergies to medications, adverse drug reactions, diagnosis change, or new procedure performed?: [x] No    [] Yes (see summary sheet for update) Subjective functional status/changes:   [] No changes reported Pt denies pain. OBJECTIVE 20 min Therapeutic Exercise:  [x] See flow sheet :  
Rationale: increase ROM and increase strength to improve the patients ability to tolerate ADLs. 10 min Neuromuscular Re-education:  [x]  See flow sheet :  
Rationale: increase strength, improve coordination and increase proprioception  to improve the patients ability to tolerate ADLs. 10 min Gait Training:  DGI performed Rationale: With 
 [] TE 
 [] TA 
 [] neuro 
 [] other: Patient Education: [x] Review HEP [] Progressed/Changed HEP based on:  
[] positioning   [] body mechanics   [] transfers   [] heat/ice application   
[] other:   
 
Other Objective/Functional Measures: DGI 23/ FOTO 99 Pain Level (0-10 scale) post treatment: 0/10 ASSESSMENT/Changes in Function: Pt demonstrates improved proprioception and strength with gait training. Patient will continue to benefit from skilled PT services to modify and progress therapeutic interventions, address functional mobility deficits, address ROM deficits, address strength deficits, analyze and address soft tissue restrictions, analyze and cue movement patterns and analyze and modify body mechanics/ergonomics to attain remaining goals. []  See Plan of Care []  See progress note/recertification 
[]  See Discharge Summary Progress towards goals / Updated goals: 
Short Term Goals: To be accomplished in 2 weeks: 
                      5. The patient will be independent and compliant with HEP to maximize therapeutic benefit.               
MET- pt reports compliance 
                      2. The patient will improve TUG score to 8 seconds to improve community ambulation. - Not tested. 10/19/18 Long Term Goals: To be accomplished in 4 weeks: 
                      7. The patient will improve FOTO score to 52 to maximize quality of life. - Met score 23/24. 10/19/18 
                      2. The patient will improve DGI score to 22/24 to reduce falls risk. -met score 23/24. 10/19/18            
                      3. The patient will improve modified tandem stance on airex to 30\" to improve ease of ADLs. - Progressing; slight LOB noted. 10/17/18 
                      4. The patient will display horizontal head turns while ambulating with no deviation to maximize ease of ambulation. - not met, Pt continues to have deviations to the right with head turns. 10/19/18 PLAN 
[]  Upgrade activities as tolerated     [x]  Continue plan of care 
[]  Update interventions per flow sheet      
[]  Discharge due to:_ 
[]  Other:_ Lulu Blanc, PTA 10/19/2018  11:58 AM 
 
Future Appointments Date Time Provider Khoa Gupta 10/19/2018 12:00 PM Skylar Bobby PTA [facility]  
10/22/2018  1:00 PM Skylar Bobby, PTA [facility]  
10/24/2018 12:00 PM Skylar Bobby PTA [facility]  
11/5/2018 12:00 PM Sinai Read, PT [facility]  
12/11/2018 11:30 AM [facility] NURSE Jurgen  
1/9/2019  1:00 PM MD Jurgen Reza

## 2018-10-22 ENCOUNTER — APPOINTMENT (OUTPATIENT)
Dept: PHYSICAL THERAPY | Age: 63
End: 2018-10-22
Payer: MEDICARE

## 2018-10-24 ENCOUNTER — APPOINTMENT (OUTPATIENT)
Dept: PHYSICAL THERAPY | Age: 63
End: 2018-10-24
Payer: MEDICARE

## 2018-10-25 NOTE — PROGRESS NOTES
[facility] [address]  Andie Simla [address], [address] 
[phone] fax Physical Therapy Discharge Summary Patient name: Michelle Zimmerman Start of Care: 2018 Referral source: Foreign Bray MD : 1955 Medical Diagnosis: Unsteadiness on feet [R26.81] 
  Onset Date:1.5 years ago Treatment Diagnosis: Balance/falls risk Prior Hospitalization: see medical history Provider#: [id number] Medications: Verified on Patient summary List  
 Comorbidities: HTN, lumbar laminectomy, sleep dysfunction Prior Level of Function: The patient reports he was able to balance better about 1.5 years ago. Visits from Start of Care: 5    Missed Visits: 0 Reporting Period : 2018 to 10/19/2018 Summary of Care: 
Short Term Goals: To be accomplished in 2 weeks: 
                      1. The patient will be independent and compliant with HEP to maximize therapeutic benefit.               
MET- pt reports compliance 
                      2. The patient will improve TUG score to 8 seconds to improve community ambulation. - Not tested. 10/19/18 Long Term Goals: To be accomplished in 4 weeks: 
                      0. The patient will improve FOTO score to 52 to maximize quality of life. - Met score 23/24. 10/19/18 
                      2. The patient will improve DGI score to 22/24 to reduce falls risk. -met score 23/24. 10/19/18            
                      3. The patient will improve modified tandem stance on airex to 30\" to improve ease of ADLs.  - Progressing; slight LOB noted. 10/17/18 
                      4. The patient will display horizontal head turns while ambulating with no deviation to maximize ease of ambulation. - not met, Pt continues to have deviations to the right with head turns. 10/19/18 G-Codes (GP) Position  Goal  CK= 40-59%  D/C  CI= 1-19% The severity rating is based on clinical judgment and the FOTO score. ASSESSMENT/RECOMMENDATIONS: The patient called and indicated and indicated that he was not making progress despite progress reported per EDIE Verma with goals assessed, DGI increase, and FOTO increase. D/C due to pt request. 
 
[x]Discontinue therapy: []Patient has reached or is progressing toward set goals [x]Patient is non-compliant or has abdicated 
    []Due to lack of appreciable progress towards set goals Leonor Addison, PT 10/25/2018 3:19 PM

## 2018-10-30 ENCOUNTER — APPOINTMENT (OUTPATIENT)
Dept: PHYSICAL THERAPY | Age: 63
End: 2018-10-30
Payer: MEDICARE

## 2018-11-05 ENCOUNTER — APPOINTMENT (OUTPATIENT)
Dept: PHYSICAL THERAPY | Age: 63
End: 2018-11-05

## 2019-05-21 ENCOUNTER — HOSPITAL ENCOUNTER (OUTPATIENT)
Dept: PHYSICAL THERAPY | Age: 64
Discharge: HOME | End: 2019-05-21
Payer: MEDICARE

## 2019-05-21 PROCEDURE — 97161 PT EVAL LOW COMPLEX 20 MIN: CPT

## 2019-05-21 PROCEDURE — 97110 THERAPEUTIC EXERCISES: CPT

## 2019-05-21 PROCEDURE — 97535 SELF CARE MNGMENT TRAINING: CPT

## 2019-05-21 NOTE — PROGRESS NOTES
PT DAILY TREATMENT NOTE 10-18    Patient Name: Manju Simms  Date:2019  : 1955  [x]  Patient  Verified  Payor: VA MEDICARE / Plan: VA MEDICARE PART A & B / Product Type: Medicare /    In time:11:01  Out time:11:34  Total Treatment Time (min): 33  Visit #: 1 of 16    Medicare/BCBS Only   Total Timed Codes (min):  18 1:1 Treatment Time:  33       Treatment Area: Unsteadiness on feet [R26.81]    SUBJECTIVE  Pain Level (0-10 scale): 0  Any medication changes, allergies to medications, adverse drug reactions, diagnosis change, or new procedure performed?: [x] No    [] Yes (see summary sheet for update)  Subjective functional status/changes:   [] No changes reported  Pt reports falling 2-3x/month secondary to instability    OBJECTIVE    15 min [x]Eval                  []Re-Eval       8 min Therapeutic Exercise:  [] See flow sheet :   Rationale: increase ROM and increase strength to improve the patients ability to perform daily tasks with reduced forward flexed posture    10 min Therapeutic Activity:  []  See flow sheet :   Rationale: self care and pt education  to improve the patients ability to self manage condition and maximize therapeutic effect             With   [] TE   [] TA   [] neuro   [] other: Patient Education: [x] Review HEP    [] Progressed/Changed HEP based on:   [] positioning   [] body mechanics   [] transfers   [] heat/ice application    [] other:      Other Objective/Functional Measures: see initial eval     Pain Level (0-10 scale) post treatment: 0    ASSESSMENT/Changes in Function: see POC    Patient will continue to benefit from skilled PT services to modify and progress therapeutic interventions, address functional mobility deficits, address ROM deficits, address strength deficits, analyze and address soft tissue restrictions, analyze and cue movement patterns, analyze and modify body mechanics/ergonomics and address imbalance/dizziness to attain remaining goals.      [x]  See Plan of Care  []  See progress note/recertification  []  See Discharge Summary         Progress towards goals / Updated goals:  Short Term Goals: To be accomplished in 2 weeks:  1. Pt will be I and compliant with HEP to promote self management of condition. 2. Pt will improve 90/90 HS flexibility to 25 deg or less for improved LE flexibility with ADLs. Long Term Goals: To be accomplished in 4 weeks:  1. Pt will demonstrate supine to sit transfer to left in 15\" for improved bed mobility and right sided strength with transfers. 2. Pt will demonstrate Rhomberg standing on compliant surface with eyes closed for 30\" to improve proprioception and self righting ability. 3. Pt will report no falls for 2 weeks to improve safety with daily tasks and community outings. 4. Pt will improve DGI to 23/24 for reduced falls risk with community ambulation.     PLAN  []  Upgrade activities as tolerated     [x]  Continue plan of care  []  Update interventions per flow sheet       []  Discharge due to:_  []  Other:_      Paula Costa DPT, CMTPT 5/21/2019  11:56 AM    Future Appointments   Date Time Provider Khoa Gupta   5/23/2019 11:30 AM Armando Davis, PT [facility]   5/29/2019  3:00 PM Ami Curls [facility]   6/3/2019 11:00 AM Mirian Gip [facility]   6/5/2019 11:00 AM Ami Curls [facility]   6/10/2019 11:00 AM Mirian Gip [facility]   6/12/2019 11:00 AM Tanika Paty, PTA [facility]   6/17/2019 11:00 AM Tanika Newman, PTA [facility]   6/19/2019 11:00 AM Tanika Paty, PTA [facility]   6/20/2019 10:30 AM [facility] [address]   6/24/2019 11:00 AM Harsha [address] [facility]   6/27/2019  3:45 PM Varsha Kendall MD [address] [facility]

## 2019-05-21 NOTE — PROGRESS NOTES
[facility]  [address] [facility] [address]  [place], [address]  [phone] fax    Plan of Care/ Statement of Necessity for Physical Therapy Services    Patient name: Bijal White Start of Care: 2019   Referral source: Donita Ramirez MD : 1955    Medical Diagnosis: Unsteadiness on feet [R26.81]  Payor: Huma Robb / Plan: VA MEDICARE PART A & B / Product Type: Medicare /  Onset Date:3 years    Treatment Diagnosis: Gait/Balance deficits   Prior Hospitalization: see medical history Provider#: [id number]   Medications: Verified on Patient summary List    Comorbidities: Parkinsonism, hx of prostate CA with prostatectomy (), Back surgery (), HTN, osteopenia, glaucoma   Prior Level of Function: Pt able to perform ADLs and self care with improved efficiency and reduced falls risk      The Plan of Care and following information is based on the information from the initial evaluation. Assessment/ key information: Pt is a 76 y/o M presenting with c/o balance and gait deficits for the past 3 years. He reports being formally diagnosed with Parkinson's about 1 year ago. He reports difficulty with use of right UE and LE in regards to strength and fine motor skills. UE coordination tests (+) on right, LE negative. No history of CVA noted in chart review. Pt demonstrates challenge with static balance with eyes closed and on compliant surface. DGI nearly insignificant for increased falls risk, although pt reports falls 2-3x/month. He does show some impulsive behavior during testing with intermittent LOB but pt able to self right. Pt signs and symptoms appear consistent with referring diagnosis, he would benefit from PT to improve balance, strength, flexibility and ADL ease to improve functional status and safety.     Evaluation Complexity History HIGH Complexity :3+ comorbidities / personal factors will impact the outcome/ POC ; Examination MEDIUM Complexity : 3 Standardized tests and measures addressing body structure, function, activity limitation and / or participation in recreation  ;Presentation LOW Complexity : Stable, uncomplicated  ;Clinical Decision Making MEDIUM Complexity : FOTO score of 26-74  Overall Complexity Rating: LOW   Problem List: decrease ROM, decrease strength, impaired gait/ balance, decrease ADL/ functional abilitiies, decrease activity tolerance, decrease flexibility/ joint mobility and decrease transfer abilities   Treatment Plan may include any combination of the following: Therapeutic exercise, Therapeutic activities, Neuromuscular re-education, Physical agent/modality, Gait/balance training, Manual therapy, Patient education, Self Care training, Functional mobility training, Home safety training and Stair training  Patient / Family readiness to learn indicated by: asking questions, trying to perform skills and interest  Persons(s) to be included in education: patient (P)  Barriers to Learning/Limitations: None  Patient Goal (s): Get my strength back and using my right side again  Patient Self Reported Health Status: fair  Rehabilitation Potential: good    Short Term Goals: To be accomplished in 2 weeks:  1. Pt will be I and compliant with HEP to promote self management of condition. 2. Pt will improve 90/90 HS flexibility to 25 deg or less for improved LE flexibility with ADLs. Long Term Goals: To be accomplished in 8 weeks:  1. Pt will demonstrate supine to sit transfer to left in 15\" for improved bed mobility and right sided strength with transfers. 2. Pt will demonstrate Rhomberg standing on compliant surface with eyes closed for 30\" to improve proprioception and self righting ability. 3. Pt will report no falls for 2 weeks to improve safety with daily tasks and community outings. 4. Pt will improve DGI to 23/24 for reduced falls risk with community ambulation.     Frequency / Duration: Patient to be seen 2 times per week for 8 weeks. Patient/ Caregiver education and instruction: Diagnosis, prognosis, self care, activity modification and exercises   [x]  Plan of care has been reviewed with PTA    Certification Period: 5/21/2019 to 7/20/2019  Aly Mcdonough DPT, CMTPT 5/21/2019 11:42 AM    ________________________________________________________________________    I certify that the above Therapy Services are being furnished while the patient is under my care. I agree with the treatment plan and certify that this therapy is necessary.     [de-identified] Signature:____________________  Date:____________Time: _________    Please sign and return to [facility] [address] Fausto Valencia [address] [place] [address] [address]  [phone] fax

## 2019-05-23 ENCOUNTER — HOSPITAL ENCOUNTER (OUTPATIENT)
Dept: PHYSICAL THERAPY | Age: 64
Discharge: HOME | End: 2019-05-23
Payer: MEDICARE

## 2019-05-23 PROCEDURE — 97112 NEUROMUSCULAR REEDUCATION: CPT

## 2019-05-23 NOTE — PROGRESS NOTES
PT DAILY TREATMENT NOTE 10-18    Patient Name: Irwin Guido  Date:2019  : 1955  [x]  Patient  Verified  Payor: [place] MEDICARE / Plan: [place] MEDICARE PART A & B / Product Type: Medicare /    In time:1118  Out time:1150  Total Treatment Time (min): 32  Visit #: 2 of 16    Medicare/BCBS Only   Total Timed Codes (min):  32 1:1 Treatment Time:  24       Treatment Area: Unsteadiness on feet [R26.81]    SUBJECTIVE  Pain Level (0-10 scale): 0  Any medication changes, allergies to medications, adverse drug reactions, diagnosis change, or new procedure performed?: [x] No    [] Yes (see summary sheet for update)  Subjective functional status/changes:   [] No changes reported  I just got my diagnosis. My biggest problem is leaning forward    OBJECTIVE    24 min Neuromuscular Re-education:  [x]  See flow sheet :   Rationale: increase strength, improve coordination and increase proprioception  to improve the patients ability to reduce fall risk    8 min Therapeutic Exercise:  [x] See flow sheet :   Rationale: increase ROM and increase strength to improve the patients ability to increase ease of ADLs               With   [] TE   [] TA   [] neuro   [] other: Patient Education: [x] Review HEP    [] Progressed/Changed HEP based on:   [] positioning   [] body mechanics   [] transfers   [] heat/ice application    [] other:      Other Objective/Functional Measures: No festination this visit. Static balance is WNL     Pain Level (0-10 scale) post treatment: 0    ASSESSMENT/Changes in Function: Continue POC. Consider adding more posterior chain exercises to reduce tendency to fall forward.     Patient will continue to benefit from skilled PT services to modify and progress therapeutic interventions, address functional mobility deficits, address ROM deficits, address strength deficits, analyze and address soft tissue restrictions, analyze and cue movement patterns, analyze and modify body mechanics/ergonomics and assess and modify postural abnormalities to attain remaining goals. []  See Plan of Care  []  See progress note/recertification  []  See Discharge Summary         Progress towards goals / Updated goals:  Short Term Goals: To be accomplished in 2 weeks:  1. Pt will be I and compliant with HEP to promote self management of condition. Met - 5/23/2019  2. Pt will improve 90/90 HS flexibility to 25 deg or less for improved LE flexibility with ADLs. Long Term Goals: To be accomplished in 4 weeks:  1. Pt will demonstrate supine to sit transfer to left in 15\" for improved bed mobility and right sided strength with transfers. 2. Pt will demonstrate Rhomberg standing on compliant surface with eyes closed for 30\" to improve proprioception and self righting ability. 3. Pt will report no falls for 2 weeks to improve safety with daily tasks and community outings. 4. Pt will improve DGI to 23/24 for reduced falls risk with community ambulation.         PLAN  []  Upgrade activities as tolerated     [x]  Continue plan of care  []  Update interventions per flow sheet       []  Discharge due to:_  []  Other:_      Janelle Sun, PT 5/23/2019  11:53 AM    Future Appointments   Date Time Provider Khoa Gupta   5/29/2019  3:00 PM Conley Nageotte [facility]   6/3/2019 11:00 AM Velmadhav Bushy [facility]   6/5/2019 11:00 AM Conley Nageotte [facility]   6/10/2019 11:00 AM Velora Bushy [facility]   6/12/2019 11:00 AM Sammi Ha, PTA [facility]   6/17/2019 11:00 AM Sammi Nba, PTA [facility]   6/19/2019 11:00 AM Sammi Nba, PTA [facility]   6/20/2019 10:30 AM [facility] [address]   6/24/2019 11:00 AM Harsha, [address] [facility]   6/27/2019  3:45 PM MD Jurgen De La O

## 2019-05-29 ENCOUNTER — HOSPITAL ENCOUNTER (OUTPATIENT)
Dept: PHYSICAL THERAPY | Age: 64
Discharge: HOME | End: 2019-05-29
Payer: MEDICARE

## 2019-05-29 PROCEDURE — 97110 THERAPEUTIC EXERCISES: CPT

## 2019-05-29 PROCEDURE — 97112 NEUROMUSCULAR REEDUCATION: CPT

## 2019-05-29 NOTE — PROGRESS NOTES
PT DAILY TREATMENT NOTE 10-18    Patient Name: Zain Thomas  Date:2019  : 1955  [x]  Patient  Verified  Payor: VA MEDICARE / Plan: VA MEDICARE PART A & B / Product Type: Medicare /    In time:3:00  Out time:3:35  Total Treatment Time (min): 35  Visit #: 3 of 16    Medicare/BCBS Only   Total Timed Codes (min):  35 1:1 Treatment Time:  24       Treatment Area: Unsteadiness on feet [R26.81]    SUBJECTIVE  Pain Level (0-10 scale): 0  Any medication changes, allergies to medications, adverse drug reactions, diagnosis change, or new procedure performed?: [x] No    [] Yes (see summary sheet for update)  Subjective functional status/changes:   [] No changes reported  \"I hate when the Parkinson's gets to you. \"    OBJECTIVE    10 min Therapeutic Exercise:  [x] See flow sheet :   Rationale: increase ROM, increase strength and improve coordination to improve the patients ability to increase LE strength      25 min Neuromuscular Re-education:  [x]  See flow sheet :   Rationale: improve balance and increase proprioception  to improve the patients ability to improve stability with ADLs        With   [] TE   [] TA   [] neuro   [] other: Patient Education: [x] Review HEP    [] Progressed/Changed HEP based on:   [] positioning   [] body mechanics   [] transfers   [] heat/ice application    [] other:      Other Objective/Functional Measures:   Able to self correct with stepping strategy with dynamic gait     Pain Level (0-10 scale) post treatment: 0    ASSESSMENT/Changes in Function:   Good static balance. Noted slight scissoring with retro ambulation and mild freezing episode with lateral step ups. Despite his reports of \"leaning forward\", trunk posture is good.      Patient will continue to benefit from skilled PT services to modify and progress therapeutic interventions, address functional mobility deficits, address ROM deficits, address strength deficits, analyze and address soft tissue restrictions, analyze and cue movement patterns, analyze and modify body mechanics/ergonomics and assess and modify postural abnormalities to attain remaining goals. []  See Plan of Care  []  See progress note/recertification  []  See Discharge Summary            Progress towards goals / Updated goals:  Short Term Goals: To be accomplished in 2 weeks:  1. Pt will be I and compliant with HEP to promote self management of condition. Met - 5/23/2019  2. Pt will improve 90/90 HS flexibility to 25 deg or less for improved LE flexibility with ADLs. Long Term Goals: To be accomplished in 4 weeks:  1. Pt will demonstrate supine to sit transfer to left in 15\" for improved bed mobility and right sided strength with transfers. 2. Pt will demonstrate Rhomberg standing on compliant surface with eyes closed for 30\" to improve proprioception and self righting ability. 3. Pt will report no falls for 2 weeks to improve safety with daily tasks and community outings. 4. Pt will improve DGI to 23/24 for reduced falls risk with community ambulation.         PLAN  []  Upgrade activities as tolerated     [x]  Continue plan of care  []  Update interventions per flow sheet       []  Discharge due to:_  []  Other:_      Altamease Grout 5/29/2019  2:36 PM    Future Appointments   Date Time Provider Khoa Gupta   5/29/2019  3:00 PM Frankie Elena [facility]   6/3/2019 11:00 AM Edward Fennel [facility]   6/5/2019 11:00 AM Frankie Elena [facility]   6/10/2019 11:00 AM Edward Fennel [facility]   6/12/2019 11:00 AM Lyle Gallardo, PTA [facility]   6/17/2019 11:00 AM Lyle Gallardo, PTA [facility]   6/19/2019 11:00 AM Lyle Gallardo, PTA [facility]   6/20/2019 10:30 AM [facility] [address]   6/24/2019 11:00 AM Harsha Schuyler[address] [facility]   6/27/2019  3:45 PM MD Jurgen Anguiano

## 2019-06-05 ENCOUNTER — HOSPITAL ENCOUNTER (OUTPATIENT)
Dept: PHYSICAL THERAPY | Age: 64
Discharge: HOME | End: 2019-06-05
Payer: MEDICARE

## 2019-06-05 PROCEDURE — 97112 NEUROMUSCULAR REEDUCATION: CPT

## 2019-06-05 PROCEDURE — 97110 THERAPEUTIC EXERCISES: CPT

## 2019-06-05 NOTE — PROGRESS NOTES
PT DAILY TREATMENT NOTE 10-18    Patient Name: Corwin Gamez  Date:2019  : 1955  [x]  Patient  Verified  Payor: VA MEDICARE / Plan: VA MEDICARE PART A & B / Product Type: Medicare /    In time:11:00  Out time:11:38  Total Treatment Time (min): 38  Visit #: 6 of 16    Medicare/BCBS Only   Total Timed Codes (min):  38 1:1 Treatment Time:  38       Treatment Area: Unsteadiness on feet [R26.81]    SUBJECTIVE  Pain Level (0-10 scale): 0  Any medication changes, allergies to medications, adverse drug reactions, diagnosis change, or new procedure performed?: [x] No    [] Yes (see summary sheet for update)  Subjective functional status/changes:   [] No changes reported  \"I still feel like I'm walking bent over, you know? \"    OBJECTIVE    10 min Therapeutic Exercise:  [x] See flow sheet :   Rationale: increase ROM, increase strength and improve coordination to improve the patients ability to increase ease with ADLs         28 min Neuromuscular Re-education:  [x]  See flow sheet :   Rationale: increase strength, improve coordination, improve balance and increase proprioception  to improve the patients ability to improve stability with ADLs           With   [] TE   [] TA   [] neuro   [] other: Patient Education: [x] Review HEP    [] Progressed/Changed HEP based on:   [] positioning   [] body mechanics   [] transfers   [] heat/ice application    [] other:      Other Objective/Functional Measures:   HS flexibility at 90/90 angle: right, 29 degrees. Left, 25 degrees     Pain Level (0-10 scale) post treatment: 0    ASSESSMENT/Changes in Function:   Added dynamic gait challenges to incorporated UE patterns. Patient continues with reports of \"bent over\" posture though he ambulates throughout clinic with fairly good posture. One episode of freezing mid lateral step ups. Continue to challenge UE/LE motor skills.      Patient will continue to benefit from skilled PT services to modify and progress therapeutic interventions, address functional mobility deficits, address ROM deficits, address strength deficits, analyze and address soft tissue restrictions, analyze and cue movement patterns, analyze and modify body mechanics/ergonomics, assess and modify postural abnormalities, address imbalance/dizziness and instruct in home and community integration to attain remaining goals. []  See Plan of Care  []  See progress note/recertification  []  See Discharge Summary           Progress towards goals / Updated goals:  Short Term Goals: To be accomplished in 2 weeks:  1. Pt will be I and compliant with HEP to promote self management of condition. Met - 5/23/2019  2. Pt will improve 90/90 HS flexibility to 25 deg or less for improved LE flexibility with ADLs. -met on left, 25 degrees. Right, 29 degrees (6/5/2019)  Long Term Goals: To be accomplished in 4 weeks:  1. Pt will demonstrate supine to sit transfer to left in 15\" for improved bed mobility and right sided strength with transfers. 2. Pt will demonstrate Rhomberg standing on compliant surface with eyes closed for 30\" to improve proprioception and self righting ability. 3. Pt will report no falls for 2 weeks to improve safety with daily tasks and community outings. 4. Pt will improve DGI to 23/24 for reduced falls risk with community ambulation.         PLAN  []  Upgrade activities as tolerated     [x]  Continue plan of care  []  Update interventions per flow sheet       []  Discharge due to:_  []  Other:_      Ben Silvestre 6/5/2019  10:37 AM    Future Appointments   Date Time Provider Khoa Gupta   6/5/2019 11:00 AM Alana Palma [facility]   6/7/2019  2:30 PM [place], [address] [facility]   6/10/2019 11:00 AM Isabelle Hands [facility]   6/12/2019 11:00 AM Gricel Banda PTA [facility]   6/17/2019 11:00 AM Gricel Banda PTA [facility]   6/19/2019 11:00 AM Gricel Banda PTA [facility]   6/20/2019 10:30 AM [facility] NURSE Milly Jolly JANNA SCHED   6/24/2019 11:00 AM Harsha, [address] [facility]   6/27/2019  3:45 PM Praveen Barry MD [address] [facility]

## 2019-06-07 ENCOUNTER — HOSPITAL ENCOUNTER (OUTPATIENT)
Dept: PHYSICAL THERAPY | Age: 64
Discharge: HOME | End: 2019-06-07
Payer: MEDICARE

## 2019-06-07 PROCEDURE — 97110 THERAPEUTIC EXERCISES: CPT

## 2019-06-07 PROCEDURE — 97112 NEUROMUSCULAR REEDUCATION: CPT

## 2019-06-07 NOTE — PROGRESS NOTES
PT DAILY TREATMENT NOTE 10-18    Patient Name: Manju Simms  Date:2019  : 1955  [x]  Patient  Verified  Payor: VA MEDICARE / Plan: VA MEDICARE PART A & B / Product Type: Medicare /    In time:2:30  Out time:3:09  Total Treatment Time (min): 39  Visit #: 5 of 16    Medicare/BCBS Only   Total Timed Codes (min):  39 1:1 Treatment Time:  30       Treatment Area: Unsteadiness on feet [R26.81]    SUBJECTIVE  Pain Level (0-10 scale): 0  Any medication changes, allergies to medications, adverse drug reactions, diagnosis change, or new procedure performed?: [x] No    [] Yes (see summary sheet for update)  Subjective functional status/changes:   [] No changes reported  \"They say I'm doing better, I cant tell maybe I am\"    OBJECTIVE    23 min Therapeutic Exercise:  [] See flow sheet :   Rationale: increase ROM and increase strength to improve the patients ability to perform daily tasks     16 min Neuromuscular Re-education:  []  See flow sheet :   Rationale: improve balance and increase proprioception  to improve the patients ability to perform functional tasks and community outings with improved stability and gait efficiency            With   [] TE   [] TA   [] neuro   [] other: Patient Education: [x] Review HEP    [] Progressed/Changed HEP based on:   [] positioning   [] body mechanics   [] transfers   [] heat/ice application    [] other:      Other Objective/Functional Measures:      Pain Level (0-10 scale) post treatment: 0    ASSESSMENT/Changes in Function: Added some trunk and scapular strength work today for improved posterior strength. Good tolerance to interventions, mild gait freeze with tandem walking but pt also talking to therapist while performing.  Progress with proprioception and trunk stability     Patient will continue to benefit from skilled PT services to modify and progress therapeutic interventions, address functional mobility deficits, address ROM deficits, address strength deficits, analyze and address soft tissue restrictions, analyze and cue movement patterns, analyze and modify body mechanics/ergonomics and address imbalance/dizziness to attain remaining goals. []  See Plan of Care  []  See progress note/recertification  []  See Discharge Summary         Progress towards goals / Updated goals:  Short Term Goals: To be accomplished in 2 weeks:  1. Pt will be I and compliant with HEP to promote self management of condition. Met - 5/23/2019  2. Pt will improve 90/90 HS flexibility to 25 deg or less for improved LE flexibility with ADLs. -met on left, 25 degrees. Right, 29 degrees (6/5/2019)  Long Term Goals: To be accomplished in 4 weeks:  1. Pt will demonstrate supine to sit transfer to left in 15\" for improved bed mobility and right sided strength with transfers. 2. Pt will demonstrate Rhomberg standing on compliant surface with eyes closed for 30\" to improve proprioception and self righting ability. 3. Pt will report no falls for 2 weeks to improve safety with daily tasks and community outings. 4. Pt will improve DGI to 23/24 for reduced falls risk with community ambulation.     PLAN  []  Upgrade activities as tolerated     []  Continue plan of care  []  Update interventions per flow sheet       []  Discharge due to:_  []  Other:_      Puja Mcguire, JERRIT, CMTPT 6/7/2019  2:44 PM    Future Appointments   Date Time Provider Khoa Gupta   6/10/2019 11:00 AM [place], [employer] [facility]   6/12/2019 11:00 [facility]   6/17/2019 11:00 [facility]   6/19/2019 11:00 AM Lluvia Heading, PTA [facility]   6/20/2019 10:30 AM [facility] [address]   6/24/2019 11:00 AM [employer], [employer] [facility]   6/27/2019  3:45 PM MD Jurgen Cornejo

## 2019-06-10 ENCOUNTER — HOSPITAL ENCOUNTER (OUTPATIENT)
Dept: PHYSICAL THERAPY | Age: 64
Discharge: HOME | End: 2019-06-10
Payer: MEDICARE

## 2019-06-10 PROCEDURE — 97110 THERAPEUTIC EXERCISES: CPT

## 2019-06-10 PROCEDURE — 97112 NEUROMUSCULAR REEDUCATION: CPT

## 2019-06-10 NOTE — PROGRESS NOTES
PT DAILY TREATMENT NOTE 10-18    Patient Name: Irwin Guido  Date:6/10/2019  : 1955  [x]  Patient  Verified  Payor: VA MEDICARE / Plan: VA MEDICARE PART A & B / Product Type: Medicare /    In time:10:50  Out time:11:38  Total Treatment Time (min): 48  Visit #: 6 of 16    Medicare/BCBS Only   Total Timed Codes (min):  48 1:1 Treatment Time:  46       Treatment Area: Unsteadiness on feet [R26.81]    SUBJECTIVE  Pain Level (0-10 scale): 0  Any medication changes, allergies to medications, adverse drug reactions, diagnosis change, or new procedure performed?: [x] No    [] Yes (see summary sheet for update)  Subjective functional status/changes:   [] No changes reported  Pt reports that he has been focusing more on walking with upright posture. Feels he is getting a little bit better    OBJECTIVE      20 min Therapeutic Exercise:  [] See flow sheet :   Rationale: increase ROM and increase strength to improve the patients ability to perform daily tasks and self care     28 min Neuromuscular Re-education:  []  See flow sheet :   Rationale: improve coordination, improve balance and increase proprioception  to improve the patients ability to perform functional tasks with improved stability            With   [] TE   [] TA   [] neuro   [] other: Patient Education: [x] Review HEP    [] Progressed/Changed HEP based on:   [] positioning   [] body mechanics   [] transfers   [] heat/ice application    [] other:      Other Objective/Functional Measures: added Trap Bar standing interventions today with good stability noted. Also added prone UE/LE work to improve general posterior chain strength     Pain Level (0-10 scale) post treatment: 0    ASSESSMENT/Changes in Function: Pt with good stability noted thus far. He shows ability to maintain dynamic and static stability on various surfaces. Pt does demo some pathway deviation with farmer's carries today but able to self right.  He reports feeling some improvement in his walking posture. Progress with trunk strength and dynamic task stability    Patient will continue to benefit from skilled PT services to modify and progress therapeutic interventions, address functional mobility deficits, address ROM deficits, address strength deficits, analyze and address soft tissue restrictions, analyze and cue movement patterns, analyze and modify body mechanics/ergonomics and address imbalance/dizziness to attain remaining goals. []  See Plan of Care  []  See progress note/recertification  []  See Discharge Summary         Progress towards goals / Updated goals:  Short Term Goals: To be accomplished in 2 weeks:  1. Pt will be I and compliant with HEP to promote self management of condition. Met - 5/23/2019  2. Pt will improve 90/90 HS flexibility to 25 deg or less for improved LE flexibility with ADLs. -met on left, 25 degrees. Right, 29 degrees (6/5/2019)  Long Term Goals: To be accomplished in 4 weeks:  1. Pt will demonstrate supine to sit transfer to left in 15\" for improved bed mobility and right sided strength with transfers. 2. Pt will demonstrate Rhomberg standing on compliant surface with eyes closed for 30\" to improve proprioception and self righting ability. Met- able to perform on [employer] with eyes closed 6/10/19  3. Pt will report no falls for 2 weeks to improve safety with daily tasks and community outings. Met- no falls since [facility] on 5/21/19   6/10/19  4. Pt will improve DGI to 23/24 for reduced falls risk with community ambulation.     PLAN  []  Upgrade activities as tolerated     []  Continue plan of care  []  Update interventions per flow sheet       []  Discharge due to:_  []  Other:_      Marvella Fabry, DPT, [facility] 6/10/2019  10:50 AM    Future Appointments   Date Time Provider Khoa Peralesi   6/10/2019 11:00 AM Hars[address]Schuyler[address] [facility]   6/12/2019 11:00 AM [facility]   6/19/2019 11:00 AM Steven Ashraf PTA [facility] 6/20/2019 10:30 AM [facility] NURSE [facility] JANNA BOSCH   6/21/2019 11:30 AM Blossom Jacob PTA [facility]   6/24/2019 11:00 AM Danish Favors [facility]   6/27/2019  3:45 PM Thomas Harrison MD [facility]

## 2019-06-12 ENCOUNTER — HOSPITAL ENCOUNTER (OUTPATIENT)
Dept: PHYSICAL THERAPY | Age: 64
Discharge: HOME | End: 2019-06-12
Payer: MEDICARE

## 2019-06-12 PROCEDURE — 97112 NEUROMUSCULAR REEDUCATION: CPT

## 2019-06-12 NOTE — PROGRESS NOTES
PT DAILY TREATMENT NOTE 10-18    Patient Name: Max Garcia  Date:2019  : 1955  [x]  Patient  Verified  Payor: VA MEDICARE / Plan: VA MEDICARE PART A & B / Product Type: Medicare /    In time:11:00  Out time:11:34  Total Treatment Time (min): 34  Visit #: 7 of 16    Medicare/BCBS Only   Total Timed Codes (min):  34 1:1 Treatment Time:  18       Treatment Area: Unsteadiness on feet [R26.81]    SUBJECTIVE  Pain Level (0-10 scale): 0/10  Any medication changes, allergies to medications, adverse drug reactions, diagnosis change, or new procedure performed?: [x] No    [] Yes (see summary sheet for update)  Subjective functional status/changes:   [] No changes reported  \"Doing okay, no falls in the last few weeks. \"    OBJECTIVE    15 min Therapeutic Exercise:  [x] See flow sheet :   Rationale: increase ROM and increase strength to improve the patients ability to perform ADL's. 19 min Neuromuscular Re-education:  [x]  See flow sheet :   Rationale: improve balance and increase proprioception  to improve the patients ability to perform functional activities. With   [x] TE   [] TA   [] neuro   [] other: Patient Education: [x] Review HEP    [] Progressed/Changed HEP based on:   [] positioning   [] body mechanics   [] transfers   [] heat/ice application    [] other:      Other Objective/Functional Measures: No changes in there ex. Pain Level (0-10 scale) post treatment: 0/10    ASSESSMENT/Changes in Function: Occasional trunk swaying during dynamic activities, able to correct balance with step correction. Continue PT to further improve ease of performing functional activities. Patient will continue to benefit from skilled PT services to modify and progress therapeutic interventions, address functional mobility deficits, address ROM deficits, address strength deficits, analyze and cue movement patterns and analyze and modify body mechanics/ergonomics to attain remaining goals.      [x]  See Plan of Care  []  See progress note/recertification  []  See Discharge Summary         Progress towards goals / Updated goals:  Short Term Goals: To be accomplished in 2 weeks:  1. Pt will be I and compliant with HEP to promote self management of condition. Met - 5/23/2019  2. Pt will improve 90/90 HS flexibility to 25 deg or less for improved LE flexibility with ADLs. -met on left, 25 degrees. Right, 29 degrees (6/5/2019)  Long Term Goals: To be accomplished in 4 weeks:  1. Pt will demonstrate supine to sit transfer to left in 15\" for improved bed mobility and right sided strength with transfers. 2. Pt will demonstrate Rhomberg standing on compliant surface with eyes closed for 30\" to improve proprioception and self righting ability. Met- able to perform on [employer] with eyes closed 6/10/19  3. Pt will report no falls for 2 weeks to improve safety with daily tasks and community outings. Met- no falls since [facility] on 5/21/19   6/10/19  4. Pt will improve DGI to 23/24 for reduced falls risk with community ambulation.     PLAN  []  Upgrade activities as tolerated     [x]  Continue plan of care  []  Update interventions per flow sheet        []  Discharge due to:_  []  Other:_      Lester Mesa, PTA 6/12/2019  10:41 AM    Future Appointments   Date Time Provider Khoa Gupta   6/12/2019 11:00 AM [facility] Dies[facility]   6/19/2019 11:00 AM Velton Dies, PTA [facility]   6/20/2019 10:30 AM [facility] NURSE HERMANN BOSCH   6/21/2019 11:30 AM Velton Dies[facility]   6/24/2019 11:00 AM [facility]as [facility]   6/27/2019  3:45 PM Dora Sung MD [address] [facility]

## 2019-06-17 ENCOUNTER — APPOINTMENT (OUTPATIENT)
Dept: PHYSICAL THERAPY | Age: 64
End: 2019-06-17
Payer: MEDICARE

## 2019-06-19 ENCOUNTER — HOSPITAL ENCOUNTER (OUTPATIENT)
Dept: PHYSICAL THERAPY | Age: 64
Discharge: HOME | End: 2019-06-19
Payer: MEDICARE

## 2019-06-19 PROCEDURE — 97110 THERAPEUTIC EXERCISES: CPT

## 2019-06-19 PROCEDURE — 97112 NEUROMUSCULAR REEDUCATION: CPT

## 2019-06-19 NOTE — PROGRESS NOTES
PT DAILY TREATMENT NOTE 10-18    Patient Name: Radha Moreno  Date:2019  : 1955  [x]  Patient  Verified  Payor: VA MEDICARE / Plan: VA MEDICARE PART A & B / Product Type: Medicare /    In time:11:00  Out time:11:30  Total Treatment Time (min): 30  Visit #: 8 of 16    Medicare/BCBS Only   Total Timed Codes (min):  30 1:1 Treatment Time:  30       Treatment Area: Unsteadiness on feet [R26.81]    SUBJECTIVE  Pain Level (0-10 scale): 0/10  Any medication changes, allergies to medications, adverse drug reactions, diagnosis change, or new procedure performed?: [x] No    [] Yes (see summary sheet for update)  Subjective functional status/changes:   [] No changes reported  Pt reports no new complaints     OBJECTIVE    20 min Therapeutic Exercise:  [x] See flow sheet :   Rationale: increase ROM and increase strength to improve the patients ability to tolerate ADLs     10 min Neuromuscular Re-education:  [x]  See flow sheet :   Rationale: increase strength, improve coordination and increase proprioception  to improve the patients ability to tolerate ADLs. With   [] TE   [] TA   [] neuro   [] other: Patient Education: [x] Review HEP    [] Progressed/Changed HEP based on:   [] positioning   [] body mechanics   [] transfers   [] heat/ice application    [] other:      Other Objective/Functional Measures: Minimal LOB with balance and gait training. Pain Level (0-10 scale) post treatment: 0/10    ASSESSMENT/Changes in Function: Pt has continues to demonstrate gait disturbances and fatigue when performing gait and balance activities. Patient will continue to benefit from skilled PT services to modify and progress therapeutic interventions, address functional mobility deficits, address ROM deficits, address strength deficits, analyze and address soft tissue restrictions, analyze and cue movement patterns and analyze and modify body mechanics/ergonomics to attain remaining goals.      []  See Plan of Care  []  See progress note/recertification  []  See Discharge Summary         Progress towards goals / Updated goals:  Short Term Goals: To be accomplished in 2 weeks:  1. Pt will be I and compliant with HEP to promote self management of condition. Met - 5/23/2019  2. Pt will improve 90/90 HS flexibility to 25 deg or less for improved LE flexibility with ADLs. -met on left, 25 degrees. Right, 29 degrees (6/5/2019)  Long Term Goals: To be accomplished in 4 weeks:  1. Pt will demonstrate supine to sit transfer to left in 15\" for improved bed mobility and right sided strength with transfers. 2. Pt will demonstrate Rhomberg standing on compliant surface with eyes closed for 30\" to improve proprioception and self righting ability. Met- able to perform on Airex with eyes closed 6/10/19  3. Pt will report no falls for 2 weeks to improve safety with daily tasks and community outings. Met- no falls since [facility] on 5/21/19   6/10/19  4. Pt will improve DGI to 23/24 for reduced falls risk with community ambulation.         PLAN  []  Upgrade activities as tolerated     [x]  Continue plan of care  []  Update interventions per flow sheet       []  Discharge due to:_  []  Other:_      Marv Brito PTA 6/19/2019  11:07 AM    Future Appointments   Date Time Provider Khoa Gupta   6/20/2019 10:30 AM [place]   6/21/2019 11:30 AM [facility]   6/24/2019 11:00 AM [facility]   6/27/2019  3:45 PM MD Jurgen Adams

## 2019-06-21 ENCOUNTER — HOSPITAL ENCOUNTER (OUTPATIENT)
Dept: PHYSICAL THERAPY | Age: 64
Discharge: HOME | End: 2019-06-21
Payer: MEDICARE

## 2019-06-21 PROCEDURE — 97112 NEUROMUSCULAR REEDUCATION: CPT

## 2019-06-21 NOTE — PROGRESS NOTES
PT DAILY TREATMENT NOTE 10-18    Patient Name: Daina Wing  Date:2019  : 1955  [x]  Patient  Verified  Payor: VA MEDICARE / Plan: VA MEDICARE PART A & B / Product Type: Medicare /    In time:11:30  Out time:12:07  Total Treatment Time (min): 37  Visit #: 9 of 16    Medicare/BCBS Only   Total Timed Codes (min):  37 1:1 Treatment Time:  21       Treatment Area: Unsteadiness on feet [R26.81]    SUBJECTIVE  Pain Level (0-10 scale): 0/10  Any medication changes, allergies to medications, adverse drug reactions, diagnosis change, or new procedure performed?: [x] No    [] Yes (see summary sheet for update)  Subjective functional status/changes:   [x] No changes reported    OBJECTIVE    22 min Therapeutic Exercise:  [x] See flow sheet :   Rationale: increase ROM and increase strength to improve the patients ability to perform ADL's. 15 min Neuromuscular Re-education:  [x]  See flow sheet :   Rationale: improve balance and increase proprioception  to improve the patients ability to perform functional activities. With   [x] TE   [] TA   [] neuro   [] other: Patient Education: [x] Review HEP    [] Progressed/Changed HEP based on:   [] positioning   [] body mechanics   [] transfers   [] heat/ice application    [] other:      Other Objective/Functional Measures: Supine to sit to left in 11 seconds. Pain Level (0-10 scale) post treatment: 0/10    ASSESSMENT/Changes in Function: Demonstrates improved bed mobility. Probably D/C to HEP next visit as pt has made good progress with LTG's. Patient will continue to benefit from skilled PT services to modify and progress therapeutic interventions, address functional mobility deficits, address ROM deficits, address strength deficits, analyze and cue movement patterns, analyze and modify body mechanics/ergonomics and address imbalance/dizziness to attain remaining goals.      [x]  See Plan of Care  []  See progress note/recertification  []  See Discharge Summary         Progress towards goals / Updated goals:  Short Term Goals: To be accomplished in 2 weeks:  1. Pt will be I and compliant with HEP to promote self management of condition. Met - 5/23/2019  2. Pt will improve 90/90 HS flexibility to 25 deg or less for improved LE flexibility with ADLs. -met on left, 25 degrees. Right, 29 degrees (6/5/2019)  Long Term Goals: To be accomplished in 4 weeks:  1. Pt will demonstrate supine to sit transfer to left in 15\" for improved bed mobility and right sided strength with transfers. - 11 seconds. 6/21/2019  2. Pt will demonstrate Rhomberg standing on compliant surface with eyes closed for 30\" to improve proprioception and self righting ability. Met- able to perform on Airex with eyes closed 6/10/19  3. Pt will report no falls for 2 weeks to improve safety with daily tasks and community outings. Met- no falls since [facility] on 5/21/19   6/10/19  4. Pt will improve DGI to 23/24 for reduced falls risk with community ambulation.     PLAN  []  Upgrade activities as tolerated     [x]  Continue plan of care  []  Update interventions per flow sheet       []  Discharge due to:_  []  Other:_      Chelsey Mcgee PTA 6/21/2019  11:25 AM    Future Appointments   Date Time Provider Khoa Gupta   6/21/2019 11:30 AM [facility]   6/24/2019 11:00 AM Harsha, [address] [facility]   6/27/2019  3:45 PM Delfina Hammonds MD [address]

## 2019-06-24 ENCOUNTER — HOSPITAL ENCOUNTER (OUTPATIENT)
Dept: PHYSICAL THERAPY | Age: 64
Discharge: HOME | End: 2019-06-24
Payer: MEDICARE

## 2019-06-24 PROCEDURE — 97110 THERAPEUTIC EXERCISES: CPT

## 2019-06-24 PROCEDURE — 97112 NEUROMUSCULAR REEDUCATION: CPT

## 2019-06-24 NOTE — PROGRESS NOTES
PT DISCHARGE DAILY NOTE AND [id number]    Date:2019  Patient name: Corwin Gamez Start of Care: 2019   Referral source: Ioana Montejo MD : 1955               Medical Diagnosis: Unsteadiness on feet [R26.81]  Payor: Feng Montiel / Plan: VA MEDICARE PART A & B / Product Type: Medicare /  Onset Date:3 years               Treatment Diagnosis: Gait/Balance deficits   Prior Hospitalization: see medical history Provider#: [id number]   Medications: Verified on Patient summary List    Comorbidities: Parkinsonism, hx of prostate CA with prostatectomy (), Back surgery (), HTN, osteopenia, glaucoma   Prior Level of Function: Pt able to perform ADLs and self care with improved efficiency and reduced falls risk  Visits from Start of Care: 10    Missed Visits: 1    Reporting Period : 2019 to 2019    [x]  Patient  Verified  Payor: VA MEDICARE / Plan: VA MEDICARE PART A & B / Product Type: Medicare /    In time:10:46  Out time:11:29  Total Treatment Time (min): 43  Total Timed Codes (min): 43  1:1 Treatment Time ( [address] only): 43   Visit #: 10 of 16    SUBJECTIVE  Pain Level (0-10 scale): 0  Any medication changes, allergies to medications, adverse drug reactions, diagnosis change, or new procedure performed?: [x] No    [] Yes (see summary sheet for update)  Subjective functional status/changes:   [] No changes reported  \"I feel like I'm walking more upright, I'm thinking about it now.  Haven't fallen in a couple of weeks\"    OBJECTIVE    28 min Therapeutic Exercise:  [] See flow sheet :   Rationale: increase ROM and increase strength to improve the patients ability to perform ADLs with increased ease    15 min Neuromuscular Re-education:  []  See flow sheet :   Rationale: improve coordination, improve balance and increase proprioception  to improve the patients ability to perform daily tasks with improved stability and safety            With   [] TE   [] TA   [] neuro   [] other: Patient Education: [x] Review HEP    [] Progressed/Changed HEP based on:   [] positioning   [] body mechanics   [] transfers   [] heat/ice application    [] other:      Other Objective/Functional Measures: FOTO score increased two points to 46%     Pain Level (0-10 scale) post treatment: 0    Summary of Care:  Short Term Goals: To be accomplished in 2 weeks:  1. Pt will be I and compliant with HEP to promote self management of condition. Met - 5/23/2019  2. Pt will improve 90/90 HS flexibility to 25 deg or less for improved LE flexibility with ADLs. -met on left, 25 degrees. Right, 29 degrees (6/5/2019)  Long Term Goals: To be accomplished in 4 weeks:  1. Pt will demonstrate supine to sit transfer to left in 15\" for improved bed mobility and right sided strength with transfers. - 11 seconds. 6/21/2019  2. Pt will demonstrate Rhomberg standing on compliant surface with eyes closed for 30\" to improve proprioception and self righting ability. Met- able to perform on Airex with eyes closed 6/10/19  3. Pt will report no falls for 2 weeks to improve safety with daily tasks and community outings. Met- no falls since [facility] on 5/21/19   6/10/19  4. Pt will improve DGI to 23/24 for reduced falls risk with community ambulation. Met- 23/34 6/24/19      ASSESSMENT/Changes in Function: Pt has progressed quite well towards his LTG's. He demonstrates good overall stability in clinic with intermittent instability on compliant surface. He reports that he feels he is walking more upright and has not fallen in a few weeks now. Pt feels that he would be fine to D/C for the remainder of summer and assess his progression with HEP.     Thank you for this referral!     PLAN  [x]Discontinue therapy: [x]Patient has reached or is progressing toward set goals      []Patient is non-compliant or has abdicated      []Due to lack of appreciable progress towards set goals    Puja Mcguire DPT, CMTPT 6/24/2019  10:55 AM

## 2019-10-03 DIAGNOSIS — G20 PARKINSON DISEASE (HCC): ICD-10-CM

## 2019-10-03 RX ORDER — PRAMIPEXOLE DIHYDROCHLORIDE 0.25 MG/1
TABLET ORAL
Qty: 270 TAB | Refills: 0 | Status: SHIPPED | OUTPATIENT
Start: 2019-10-03

## 2019-10-03 RX ORDER — CARBIDOPA AND LEVODOPA 25; 100 MG/1; MG/1
TABLET ORAL
Qty: 270 TAB | Refills: 0 | Status: SHIPPED | OUTPATIENT
Start: 2019-10-03

## 2020-06-02 ENCOUNTER — HOSPITAL ENCOUNTER (OUTPATIENT)
Dept: BONE DENSITY | Age: 65
Discharge: HOME | End: 2020-06-02
Attending: INTERNAL MEDICINE
Payer: MEDICARE

## 2020-06-02 DIAGNOSIS — M85.89 DISAPPEARING BONE DISEASE: ICD-10-CM

## 2020-06-02 PROCEDURE — 77080 DXA BONE DENSITY AXIAL: CPT

## 2020-09-17 ENCOUNTER — HOSPITAL ENCOUNTER (OUTPATIENT)
Dept: PHYSICAL THERAPY | Age: 65
End: 2020-09-17
Payer: MEDICARE

## 2020-09-29 ENCOUNTER — HOSPITAL ENCOUNTER (OUTPATIENT)
Dept: PHYSICAL THERAPY | Age: 65
Discharge: HOME | End: 2020-09-29
Payer: MEDICARE

## 2020-09-29 PROCEDURE — 97530 THERAPEUTIC ACTIVITIES: CPT

## 2020-09-29 PROCEDURE — 97110 THERAPEUTIC EXERCISES: CPT

## 2020-09-29 PROCEDURE — 97162 PT EVAL MOD COMPLEX 30 MIN: CPT

## 2020-09-29 NOTE — PROGRESS NOTES
PT DAILY TREATMENT NOTE 10-18    Patient Name: Baldomero Richey  Date:2020  : 1955  [x]  Patient  Verified  Payor: VA MEDICARE / Plan: VA MEDICARE PART A & B / Product Type: Medicare /    In time:1250  Out time:130  Total Treatment Time (min): 40  Visit #: 1 of 8    Medicare/BCBS Only   Total Timed Codes (min):  24 1:1 Treatment Time:  40       Treatment Area: Abnormal gait [R26.9]    SUBJECTIVE  Pain Level (0-10 scale): 0  Any medication changes, allergies to medications, adverse drug reactions, diagnosis change, or new procedure performed?: [x] No    [] Yes (see summary sheet for update)  Subjective functional status/changes:   [] No changes reported       OBJECTIVE    Modality rationale:    Min Type Additional Details    [] Estim:  []Unatt       []IFC  []Premod                        []Other:  []w/ice   []w/heat  Position:  Location:    [] Estim: []Att    []TENS instruct  []NMES                    []Other:  []w/US   []w/ice   []w/heat  Position:  Location:    []  Traction: [] Cervical       []Lumbar                       [] Prone          []Supine                       []Intermittent   []Continuous Lbs:  [] before manual  [] after manual    []  Ultrasound: []Continuous   [] Pulsed                           []1MHz   []3MHz W/cm2:  Location:    []  Iontophoresis with dexamethasone         Location: [] Take home patch   [] In clinic    []  Ice     []  heat  []  Ice massage  []  Laser   []  Anodyne Position:  Location:    []  Laser with stim  []  Other:  Position:  Location:    []  Vasopneumatic Device Pressure:       [] lo [] med [] hi   Temperature: [] lo [] med [] hi   [] Skin assessment post-treatment:  []intact []redness- no adverse reaction    []redness  adverse reaction:     16 min [x]Eval                  []Re-Eval       15 min Therapeutic Exercise:  [] See flow sheet :HEP   Rationale: increase strength to improve the patients ability to perform functional tasks.      9 min Therapeutic Activity:  []  See flow sheet : bed mobility, transfers, pt education fall prevention and getting up from fall handouts   Rationale: improve mobility and decrease fall risk  to improve the patients ability to perform transfers and daily activities             With   [] TE   [] TA   [] neuro   [] other: Patient Education: [x] Review HEP    [] Progressed/Changed HEP based on:   [] positioning   [] body mechanics   [] transfers   [] heat/ice application    [] other:      Other Objective/Functional Measures:       Pain Level (0-10 scale) post treatment: 0    ASSESSMENT/Changes in Function:      Patient will continue to benefit from skilled PT services to modify and progress therapeutic interventions, address functional mobility deficits, address strength deficits, analyze and cue movement patterns, assess and modify postural abnormalities and address imbalance/dizziness to attain remaining goals. [x]  See Plan of Care  []  See progress note/recertification  []  See Discharge Summary         Progress towards goals / Updated goals:  Short Term Goals: To be accomplished in 1 weeks:   1. The pt will be I and compliant with HEP   IE- issued and instructed in HEP    Long Term Goals: To be accomplished in 4 weeks:   1. Improve FOTO score to 54 for improved ability for ADL   IE- 16   2. The pt will demonstrate 30\" Romberg on airex with EC for improved balance with standing activities. IE- 15 sec. 3. The pt will report no falls with household ambulation. IE- falls daily   4. The pt will demonstrate ability to perform supine to sit transfer with independence. IE- min assist needed   5. The pt will demonstrate ability for 10 sit to stands without use of UEs to improve ability for transfers. IE- UE use noted.        PLAN  []  Upgrade activities as tolerated     [x]  Continue plan of care  []  Update interventions per flow sheet       []  Discharge due to:_  []  Other:_      Joel Rodas, PT 9/29/2020  1:30 PM    Future Appointments   Date Time Provider Khoa Gupta   10/1/2020 10:30 AM Mane Situ, PTA [facility]   10/6/2020 10:30 AM Layvonne Heart [facility]   10/8/2020 12:00 PM Bandar Vasquez, PTA [facility]   10/13/2020 12:00 PM Bandar Vasquez, PTA [facility]   10/15/2020 12:00 PM Bandar Vasquez, PTA [facility]   10/20/2020 12:00 PM Bandar Vasquez, PTA [facility]   10/22/2020 12:00 PM Bandar Vasquez, PTA [facility]   10/27/2020 12:00 PM Bandar Vasquez, PTA [facility]   12/4/2020  1:00 PM [facility] NURSE [facility]

## 2020-09-29 NOTE — PROGRESS NOTES
[facility] [address] [place], [place] [address]  [address]  [place], [address]  [phone] fax    Plan of Care/ Statement of Necessity for Physical Therapy Services    Patient name: Gómez Villagran Start of Care: 2020   Referral source: Nino Mcardle, NP : 1955    Medical Diagnosis: Abnormal gait [R26.9]  Payor: VA MEDICARE / Plan: VA MEDICARE PART A & B / Product Type: Medicare /  Onset Date: Worsening since 2020    Treatment Diagnosis: unsteady gait   Prior Hospitalization: see medical history Provider#: [id number]   Medications: Verified on Patient summary List    Comorbidities: parkinson's, cancer   Prior Level of Function: functionally I with daily tasks. The Plan of Care and following information is based on the information from the initial evaluation. Assessment/ key information: 78 y/o male presents with recent increase in falls and difficulty with walking due to parkinson's. The pt demonstrates decreased strength of B LEs, limited ability for transfers and bed mobility, limited romberg on airex and unsteady gait with decreased B step length and shuffling at times. The pt does report having a cane and walker with wheels but states he does not use either. The pt will benefit from PT to address the aforementioned impairments. Evaluation Complexity History MEDIUM  Complexity : 1-2 comorbidities / personal factors will impact the outcome/ POC ; Examination MEDIUM Complexity : 3 Standardized tests and measures addressing body structure, function, activity limitation and / or participation in recreation  ;Presentation MEDIUM Complexity : Evolving with changing characteristics  ; Clinical Decision Making HIGH Complexity : FOTO score of 1- 25   Overall Complexity Rating: MEDIUM  Problem List: decrease strength, impaired gait/ balance, decrease ADL/ functional abilitiies, decrease activity tolerance and decrease transfer abilities   Treatment Plan may include any combination of the following: Therapeutic exercise, Therapeutic activities, Neuromuscular re-education, Gait/balance training, Manual therapy, Patient education, Self Care training, Home safety training and Stair training  Patient / Family readiness to learn indicated by: asking questions, trying to perform skills and interest  Persons(s) to be included in education: patient (P)  Barriers to Learning/Limitations: no  Patient Goal (s): To have better motion  Patient Self Reported Health Status: fair  Rehabilitation Potential: fair    Short Term Goals: To be accomplished in 1 weeks:   1. The pt will be I and compliant with HEP    Long Term Goals: To be accomplished in 4 weeks:   1. Improve FOTO score to 54 for improved ability for ADL   2. The pt will demonstrate 30\" Romberg on airex with EC for improved balance with standing activities. .   3. The pt will report no falls with household ambulation. 4. The pt will demonstrate ability to perform supine to sit transfer with independence. 5. The pt will demonstrate ability for 10 sit to stands without use of UEs to improve ability for transfers. Frequency / Duration: Patient to be seen 2 times per week for 4 weeks. Patient/ Caregiver education and instruction: Diagnosis, prognosis, self care, exercises and other fall prevention   [x]  Plan of care has been reviewed with PTA    Certification Period: 09-29-20 to 10-28-20  Izabel Borden PT 9/29/2020 1:34 PM    ________________________________________________________________________    I certify that the above Therapy Services are being furnished while the patient is under my care. I agree with the treatment plan and certify that this therapy is necessary.     [de-identified] Signature:____________________  Date:____________Time: _________    Please sign and return to [facility] [address] Fausto [place] [facility] [address]  [phone] fax

## 2020-10-01 ENCOUNTER — HOSPITAL ENCOUNTER (OUTPATIENT)
Dept: PHYSICAL THERAPY | Age: 65
Discharge: HOME | End: 2020-10-01
Payer: MEDICARE

## 2020-10-01 PROCEDURE — 97116 GAIT TRAINING THERAPY: CPT

## 2020-10-01 PROCEDURE — 97110 THERAPEUTIC EXERCISES: CPT

## 2020-10-01 NOTE — PROGRESS NOTES
PT DAILY TREATMENT NOTE 10-18    Patient Name: Jacquelyn Tejada  Date:10/1/2020  : 1955  [x]  Patient  Verified  Payor: VA MEDICARE / Plan: VA MEDICARE PART A & B / Product Type: Medicare /    In time:10:30  Out time:11:00  Total Treatment Time (min): 30  Visit #: 2 of 8    Medicare/BCBS Only   Total Timed Codes (min):  30 1:1 Treatment Time:  30       Treatment Area: Abnormal gait [R26.9]    SUBJECTIVE  Pain Level (0-10 scale): 0/10  Any medication changes, allergies to medications, adverse drug reactions, diagnosis change, or new procedure performed?: [x] No    [] Yes (see summary sheet for update)  Subjective functional status/changes:   [] No changes reported  Pt reports no new complaints of pain. Pt reports compliance with HEP. OBJECTIVE    22 min Therapeutic Exercise:  [x] See flow sheet :   Rationale: increase ROM and increase strength to improve the patients ability to perform ADLs with increased ease. 8 min Gait Training:  Dynamic gait: marching, backwards and side steppin feet each with no device on level surfaces with SBA level of assist   Rationale: With   [] TE   [] TA   [] neuro   [] other: Patient Education: [x] Review HEP    [] Progressed/Changed HEP based on:   [] positioning   [] body mechanics   [] transfers   [] heat/ice application    [] other:      Other Objective/Functional Measures: initiated exercises as per flow sheet. Pain Level (0-10 scale) post treatment: 0/10    ASSESSMENT/Changes in Function: Pt demonstrates frequent LOB when performing balance activities and gait training. Patient will continue to benefit from skilled PT services to modify and progress therapeutic interventions, address functional mobility deficits, address ROM deficits, address strength deficits, analyze and address soft tissue restrictions, analyze and cue movement patterns and analyze and modify body mechanics/ergonomics to attain remaining goals.      []  See Plan of Care  []  See progress note/recertification  []  See Discharge Summary         Progress towards goals / Updated goals:  Short Term Goals: To be accomplished in 1 weeks:              1. The pt will be I and compliant with HEP              IE- issued and instructed in HEP   Current: Met per patient report. 10/01/2020     Long Term Goals: To be accomplished in 4 weeks:              1. Improve FOTO score to 54 for improved ability for ADL              IE- 16              2. The pt will demonstrate 30\" Romberg on airex with EC for improved balance with standing activities. IE- 15 sec. 3. The pt will report no falls with household ambulation. IE- falls daily              4. The pt will demonstrate ability to perform supine to sit transfer with independence. IE- min assist needed              5. The pt will demonstrate ability for 10 sit to stands without use of UEs to improve ability for transfers. IE- UE use noted.    PLAN  []  Upgrade activities as tolerated     [x]  Continue plan of care  []  Update interventions per flow sheet       []  Discharge due to:_  []  Other:_      Everardo Joshi, EDIE 10/1/2020  10:42 AM    Future Appointments   Date Time Provider Khoa Gupta   10/6/2020 10:30 AM Harsha [address] [facility]   10/8/2020 12:00 PM Carlyle Louder, PTA [facility]   10/13/2020 12:00 PM Carlyle Louder, PTA [facility]   10/16/2020 12:15 PM Carlyle Louder, PTA [facility]   10/20/2020 12:00 PM Carlyle Louder, PTA [facility]   10/22/2020 12:00 PM Carlyle Louder, PTA [facility]   10/27/2020 12:00 PM Carlyle Louder, PTA [facility]   12/4/2020  1:00 PM [facility]

## 2020-10-06 ENCOUNTER — HOSPITAL ENCOUNTER (OUTPATIENT)
Dept: PHYSICAL THERAPY | Age: 65
Discharge: HOME | End: 2020-10-06
Payer: MEDICARE

## 2020-10-06 PROCEDURE — 97112 NEUROMUSCULAR REEDUCATION: CPT

## 2020-10-06 PROCEDURE — 97530 THERAPEUTIC ACTIVITIES: CPT

## 2020-10-06 PROCEDURE — 97110 THERAPEUTIC EXERCISES: CPT

## 2020-10-06 NOTE — PROGRESS NOTES
PT DAILY TREATMENT NOTE 10-18    Patient Name: Irving Logan  Date:10/6/2020  : 1955  [x]  Patient  Verified  Payor: [place] MEDICARE / Plan: [place] MEDICARE PART A & B / Product Type: Medicare /    In time:1030  Out time:1114  Total Treatment Time (min): 44  Visit #: 3 of 8    Medicare/BCBS Only   Total Timed Codes (min):  44 1:1 Treatment Time:  44       Treatment Area: Abnormal gait [R26.9]    SUBJECTIVE  Pain Level (0-10 scale): 0  Any medication changes, allergies to medications, adverse drug reactions, diagnosis change, or new procedure performed?: [x] No    [] Yes (see summary sheet for update)  Subjective functional status/changes:   [x] No changes reported      OBJECTIVE    19 min Therapeutic Exercise:  [] See flow sheet :   Rationale: increase strength to improve the patients stability for daily activities      15 min Neuromuscular Re-education:  []  See flow sheet :   Rationale: increase strength, improve coordination, improve balance and increase proprioception  to improve the patients ability to ambulate and perform daily activities without LOB  10 min Therapeutic Activity:  []  See flow sheet :   Rationale: increase ROM and increase strength  to improve the patients ability to perform bed mobility and sit <> stands without difficulty for ADLs        With   [] TE   [] TA   [] neuro   [] other: Patient Education: [x] Review HEP    [] Progressed/Changed HEP based on:   [] positioning   [] body mechanics   [] transfers   [] heat/ice application    [] other:      Other Objective/Functional Measures:      Pain Level (0-10 scale) post treatment: 0    ASSESSMENT/Changes in Function: Difficult initiating movement on right side with dynamic gait. Increased stability noted with static balance, as patient was able to maintain Romberg for longer periods of time on compromised surface.      Patient will continue to benefit from skilled PT services to modify and progress therapeutic interventions, address strength deficits, analyze and cue movement patterns and address imbalance/dizziness to attain remaining goals. []  See Plan of Care  []  See progress note/recertification  []  See Discharge Summary         Progress towards goals / Updated goals:  Short Term Goals: To be accomplished in 1 weeks:              1. The pt will be I and compliant with HEP              IE- issued and instructed in HEP              Current: Met per patient report. 10/01/2020     Long Term Goals: To be accomplished in 4 weeks:              1. Improve FOTO score to 54 for improved ability for ADL              IE- 16              2. The pt will demonstrate 30\" Romberg on airex with EC for improved balance with standing activities.             IE- 15 sec. Current: 20 seconds 10/6/20              3. The pt will report no falls with household ambulation.              IE- falls daily   Current: fell in garage 10/6/20              4. The pt will demonstrate ability to perform supine to sit transfer with independence.               IE- min assist needed              5. The pt will demonstrate ability for 10 sit to stands without use of UEs to improve ability for transfers.               IE- UE use noted.     Current: 20x with UE use 10/6/20  PLAN  []  Upgrade activities as tolerated     [x]  Continue plan of care  []  Update interventions per flow sheet       []  Discharge due to:_  []  Other:_      Rayna Garcia, MPT, CMTPT 10/6/2020  8:41 AM    Future Appointments   Date Time Provider Khoa Gupta   10/6/2020 10:30 AM Tasneem RIVERA, PT [facility]   10/8/2020 12:00 PM Carlyle Louder, PTA [facility]   10/13/2020 12:00 PM Carlyle Louder, PTA [facility]   10/16/2020 12:15 PM Carlyle Louder, PTA [facility]   10/20/2020 12:00 PM Carlyle Louder, PTA [facility]   10/22/2020 12:00 PM Carlyle Louder, PTA [facility]   10/27/2020 12:00 PM Carlyle Louder, PTA [facility]   11/2/2020  2:15 PM Ervin Lesch, Avery Curiel MD [facility]   12/4/2020  1:00 PM [facility] NURSE [facility]

## 2020-10-08 ENCOUNTER — HOSPITAL ENCOUNTER (OUTPATIENT)
Dept: PHYSICAL THERAPY | Age: 65
Discharge: HOME | End: 2020-10-08
Payer: MEDICARE

## 2020-10-08 PROCEDURE — 97530 THERAPEUTIC ACTIVITIES: CPT

## 2020-10-08 PROCEDURE — 97110 THERAPEUTIC EXERCISES: CPT

## 2020-10-08 PROCEDURE — 97112 NEUROMUSCULAR REEDUCATION: CPT

## 2020-10-08 NOTE — PROGRESS NOTES
PT DAILY TREATMENT NOTE 10-18    Patient Name: Renay Burns  Date:10/8/2020  : 1955  [x]  Patient  Verified  Payor: [place] MEDICARE / Plan: VA MEDICARE PART A & B / Product Type: Medicare /    In time:12:00  Out time:12:45  Total Treatment Time (min): 45  Visit #: 4 of 8    Medicare/BCBS Only   Total Timed Codes (min):  45 1:1 Treatment Time:  45       Treatment Area: Abnormal gait [R26.9]    SUBJECTIVE  Pain Level (0-10 scale): 0/10  Any medication changes, allergies to medications, adverse drug reactions, diagnosis change, or new procedure performed?: [x] No    [] Yes (see summary sheet for update)  Subjective functional status/changes:   [] No changes reported  \"My balance is off.\"    OBJECTIVE    10 min Therapeutic Exercise:  [x] See flow sheet :   Rationale: increase ROM and increase strength to improve the patients ability to perform ADL's. 10 min Therapeutic Activity:  [x]  See flow sheet : Sit to stands, supine to sit. Rationale: improve coordination and increase proprioception  to improve the patients ability to perform functional activities. 25 min Neuromuscular Re-education:  [x]  See flow sheet : Dynamic gait inside and outside on uneven terrain, static balance on airex. Rationale: improve coordination, improve balance and increase proprioception  to improve the patients ability to safely negotiate community distances. With   [x] TE   [] TA   [] neuro   [] other: Patient Education: [x] Review HEP    [] Progressed/Changed HEP based on:   [] positioning   [] body mechanics   [] transfers   [] heat/ice application    [] other:      Other Objective/Functional Measures: Pt reports falling ~1x a day. Pain Level (0-10 scale) post treatment: 0/10    ASSESSMENT/Changes in Function: Pt fully participated in treatment and thanked clinician for services. Unable to maintain balance with tandem gait. Performed sit to stands from 44cm height table with good form/mechanics.  Cueing to correct supine to sitting mechanics. Patient will continue to benefit from skilled PT services to modify and progress therapeutic interventions, address functional mobility deficits, address strength deficits, analyze and cue movement patterns, analyze and modify body mechanics/ergonomics and address imbalance/dizziness to attain remaining goals. [x]  See Plan of Care  []  See progress note/recertification  []  See Discharge Summary         Progress towards goals / Updated goals:  Short Term Goals: To be accomplished in 1 weeks:              1. The pt will be I and compliant with HEP              IE- issued and instructed in HEP              Current: Met per patient report. 10/01/2020     Long Term Goals: To be accomplished in 4 weeks:              1. Improve FOTO score to 54 for improved ability for ADL              IE- 16              2. The pt will demonstrate 30\" Romberg on airex with EC for improved balance with standing activities.             IE- 15 sec. Current: 20 seconds 10/6/20              3. The pt will report no falls with household ambulation.              IE- falls daily              Current: fell in garage 10/6/20              4. The pt will demonstrate ability to perform supine to sit transfer with independence.               IE- min assist needed              5.  The pt will demonstrate ability for 10 sit to stands without use of UEs to improve ability for transfers.               IE- UE use noted.               Current: 20x with UE use 10/6/20    PLAN  []  Upgrade activities as tolerated     [x]  Continue plan of care  []  Update interventions per flow sheet       []  Discharge due to:_  []  Other:_      Ashlee Holley PTA 10/8/2020  12:05 PM    Future Appointments   Date Time Provider Khoa Gupta   10/12/2020 10:30 AM Juliette Faster, PTA [facility]   10/16/2020 12:15 PM Juliette Faster, PTA [facility]   10/20/2020 12:00 PM Juliette Faster, PTA [facility]   10/22/2020 12:00 PM Simona Marcelo, PTA [facility]   10/23/2020  3:20 PM [facility] NURSE [facility]   10/27/2020 12:00 PM Simona Marcelo, PTA [facility]   11/2/2020  2:15 PM Guy Allen MD [facility]

## 2020-10-12 ENCOUNTER — HOSPITAL ENCOUNTER (OUTPATIENT)
Dept: PHYSICAL THERAPY | Age: 65
Discharge: HOME | End: 2020-10-12
Payer: MEDICARE

## 2020-10-12 PROCEDURE — 97530 THERAPEUTIC ACTIVITIES: CPT

## 2020-10-12 PROCEDURE — 97110 THERAPEUTIC EXERCISES: CPT

## 2020-10-12 PROCEDURE — 97112 NEUROMUSCULAR REEDUCATION: CPT

## 2020-10-12 NOTE — PROGRESS NOTES
PT DAILY TREATMENT NOTE 10-18    Patient Name: Srinivas Stinson  Date:10/12/2020  : 1955  [x]  Patient  Verified  Payor: VA MEDICARE / Plan: VA MEDICARE PART A & B / Product Type: Medicare /    In time:10:30  Out time:11:08  Total Treatment Time (min): 38  Visit #: 5 of 8    Medicare/BCBS Only   Total Timed Codes (min):  38 1:1 Treatment Time:  38       Treatment Area: Abnormal gait [R26.9]    SUBJECTIVE  Pain Level (0-10 scale): 0/10  Any medication changes, allergies to medications, adverse drug reactions, diagnosis change, or new procedure performed?: [x] No    [] Yes (see summary sheet for update)  Subjective functional status/changes:   [] No changes reported  \"Doing okay but I fell yesterday while putting something in the trash. I think I reached too far. \"    OBJECTIVE    10 min Therapeutic Exercise:  [x] See flow sheet :   Rationale: increase strength and increase proprioception to improve the patients ability to perform ADL's.    8 min Therapeutic Activity:  [x]  See flow sheet : picking up/placing down cones. Rationale: improve coordination, improve balance and increase proprioception  to improve the patients ability to safely negotiate household distances and perform chores. 20 min Neuromuscular Re-education:  [x]  See flow sheet : Static and dynamic balance, dynamic gait, 1/2 stance cone reaching. Rationale: improve balance and increase proprioception  to improve the patients ability to perform functional activities. With   [x] TE   [] TA   [] neuro   [] other: Patient Education: [x] Review HEP    [] Progressed/Changed HEP based on:   [] positioning   [] body mechanics   [] transfers   [] heat/ice application    [] other:      Other Objective/Functional Measures: Demonstrated good balance and weight shifting with 1/2 stance cone reaching. Pt has difficulty with head turns while walking and has tendency to not fully lift feet during gait.     Pain Level (0-10 scale) post treatment: 0/10    ASSESSMENT/Changes in Function: Pt reports falling 1x since previous PT visit. Continue PT to increase strength and balance to improve gait and decrease risk of falls. Patient will continue to benefit from skilled PT services to modify and progress therapeutic interventions, address functional mobility deficits, address strength deficits, analyze and cue movement patterns, analyze and modify body mechanics/ergonomics and address imbalance/dizziness to attain remaining goals. [x]  See Plan of Care  []  See progress note/recertification  []  See Discharge Summary         Progress towards goals / Updated goals:  Short Term Goals: To be accomplished in 1 weeks:              1. The pt will be I and compliant with HEP              IE- issued and instructed in HEP              Current: Met per patient report. 10/01/2020     Long Term Goals: To be accomplished in 4 weeks:              1. Improve FOTO score to 54 for improved ability for ADL              IE- 16              2. The pt will demonstrate 30\" Romberg on airex with EC for improved balance with standing activities.             FS- 15 sec.              IAVHLZV: 20 seconds 10/6/20              3. The pt will report no falls with household ambulation.              IE- falls daily              Current: fell in garage 10/6/20; [employer] in kitchen while throwing something in the trash. 10/12/2020              4. The pt will demonstrate ability to perform supine to sit transfer with independence.               IE- min assist needed              5.  The pt will demonstrate ability for 10 sit to stands without use of UEs to improve ability for transfers.               IE- UE use noted.               KITZGVH: 20x with UE use 10/6/20    PLAN  []  Upgrade activities as tolerated     [x]  Continue plan of care  []  Update interventions per flow sheet       []  Discharge due to:_  []  Other:_      Era Delong, PTA 10/12/2020  10:11 AM    Future Appointments   Date Time Provider Khoa Gupta   10/12/2020 10:30 AM Juliette Faster, PTA [facility]   10/16/2020 12:15 PM Juliette Faster, PTA [facility]   10/20/2020 12:00 PM Juliette Faster, PTA [facility]   10/22/2020 12:00 PM Juliette Faster, PTA [facility]   10/23/2020  3:20 PM [facility] NURSE [facility]   10/27/2020 12:00 PM Juliette Faster, PTA [facility]   11/2/2020  2:15 PM Edilia Seip, MD [facility]

## 2020-10-13 ENCOUNTER — APPOINTMENT (OUTPATIENT)
Dept: PHYSICAL THERAPY | Age: 65
End: 2020-10-13
Payer: MEDICARE

## 2020-10-15 ENCOUNTER — APPOINTMENT (OUTPATIENT)
Dept: PHYSICAL THERAPY | Age: 65
End: 2020-10-15
Payer: MEDICARE

## 2020-10-16 ENCOUNTER — HOSPITAL ENCOUNTER (OUTPATIENT)
Dept: PHYSICAL THERAPY | Age: 65
Discharge: HOME | End: 2020-10-16
Payer: MEDICARE

## 2020-10-16 PROCEDURE — 97110 THERAPEUTIC EXERCISES: CPT

## 2020-10-16 PROCEDURE — 97530 THERAPEUTIC ACTIVITIES: CPT

## 2020-10-16 PROCEDURE — 97112 NEUROMUSCULAR REEDUCATION: CPT

## 2020-10-16 NOTE — PROGRESS NOTES
PT DAILY TREATMENT NOTE 10-18    Patient Name: Zaire Plascencia  Date:10/16/2020  : 1955  [x]  Patient  Verified  Payor: VA MEDICARE / Plan: VA MEDICARE PART A & B / Product Type: Medicare /    In time:12:15  Out time:1:00  Total Treatment Time (min): 45  Visit #: 6 of 8    Medicare/BCBS Only   Total Timed Codes (min):  45 1:1 Treatment Time:  45       Treatment Area: Abnormal gait [R26.9]    SUBJECTIVE  Pain Level (0-10 scale): 0/10   Any medication changes, allergies to medications, adverse drug reactions, diagnosis change, or new procedure performed?: [x] No    [] Yes (see summary sheet for update)  Subjective functional status/changes:   [] No changes reported  \"I haven't fallen but I have caught myself a few times. \"    OBJECTIVE    10 min Therapeutic Exercise:  [x] See flow sheet :   Rationale: increase ROM and increase strength to improve the patients ability to perform ADL's. 10 min Therapeutic Activity:  [x]  See flow sheet : Table transitions, sit to stands with airex. Rationale: increase strength, improve coordination and increase proprioception  to improve the patients ability to perform (I) transfers. 25 min Neuromuscular Re-education:  [x]  See flow sheet : Static and dynamic balance, dynamic gait, 1/2 stance cone reaching. Rationale: increase strength, improve balance and increase proprioception  to improve the patients ability to perf         With   [x] TE   [] TA   [] neuro   [] other: Patient Education: [x] Review HEP    [] Progressed/Changed HEP based on:   [] positioning   [] body mechanics   [] transfers   [] heat/ice application    [] other:      Other Objective/Functional Measures: Added airex to floor for 1/2 stance cone reaching. Pain Level (0-10 scale) post treatment: 0/10    ASSESSMENT/Changes in Function: Pt able to perform supine <-> sit (I) with \"quite a bit of difficulty\". Pt fully participated in treatment, improved (I) with table transfers.  No evidence of imbalance with sit to stands, continues to have occasional imbalance while turning during gait. Patient will continue to benefit from skilled PT services to modify and progress therapeutic interventions, address functional mobility deficits, address strength deficits, analyze and cue movement patterns, analyze and modify body mechanics/ergonomics and address imbalance/dizziness to attain remaining goals. [x]  See Plan of Care  []  See progress note/recertification  []  See Discharge Summary         Progress towards goals / Updated goals:  Short Term Goals: To be accomplished in 1 weeks:              1. The pt will be I and compliant with HEP              IE- issued and instructed in HEP              Current: Met per patient report. 10/01/2020     Long Term Goals: To be accomplished in 4 weeks:              1. Improve FOTO score to 54 for improved ability for ADL              IE- 16              2. The pt will demonstrate 30\" Romberg on airex with EC for improved balance with standing activities.             BE- 15 sec.              YKDJZZW: 20 seconds 10/6/20              3. The pt will report no falls with household ambulation.              IE- falls daily              Current: fell in garage 10/6/20; Camilo Marte in kitchen while throwing something in the trash. 10/12/2020              4. The pt will demonstrate ability to perform supine to sit transfer with independence.               IE- min assist needed   Current: Pt able to perform supine <-> sit (I) with \"quite a bit of difficulty\". 10/16/2020              5.  The pt will demonstrate ability for 10 sit to stands without use of UEs to improve ability for transfers.               IE- UE use noted.               EJXOSDY: 20x with UE use 10/6/20    PLAN  []  Upgrade activities as tolerated     [x]  Continue plan of care  []  Update interventions per flow sheet       []  Discharge due to:_  []  Other:_      Frankie Vazquez, PTA 10/16/2020  11:52 AM    Future Appointments   Date Time Provider Khoa Candy   10/16/2020 12:15 PM Ekaterina Richards, PTA [facility]   10/20/2020 12:00 PM Ekaterina Richards, PTA [facility]   10/22/2020 12:00 PM Ekaterina Richards, PTA [facility]   10/23/2020  3:20 PM [facility] NURSE [facility]   10/27/2020 12:00 PM Ekaterina Richards, PTA [facility]   11/2/2020  2:15 PM Siri Knight MD [facility]

## 2020-10-20 ENCOUNTER — HOSPITAL ENCOUNTER (OUTPATIENT)
Dept: PHYSICAL THERAPY | Age: 65
Discharge: HOME | End: 2020-10-20
Payer: MEDICARE

## 2020-10-20 PROCEDURE — 97112 NEUROMUSCULAR REEDUCATION: CPT

## 2020-10-20 PROCEDURE — 97530 THERAPEUTIC ACTIVITIES: CPT

## 2020-10-20 PROCEDURE — 97110 THERAPEUTIC EXERCISES: CPT

## 2020-10-20 NOTE — PROGRESS NOTES
PT DAILY TREATMENT NOTE 10-18    Patient Name: Mk Vincent  Date:10/20/2020  : 1955  [x]  Patient  Verified  Payor: VA MEDICARE / Plan: VA MEDICARE PART A & B / Product Type: Medicare /    In time:12:00  Out time:12:42  Total Treatment Time (min): 42  Visit #: 7 of 8    Medicare/BCBS Only   Total Timed Codes (min):  42 1:1 Treatment Time:  41       Treatment Area: Abnormal gait [R26.9]    SUBJECTIVE  Pain Level (0-10 scale): 0/10  Any medication changes, allergies to medications, adverse drug reactions, diagnosis change, or new procedure performed?: [x] No    [] Yes (see summary sheet for update)  Subjective functional status/changes:   [] No changes reported  \"No pain. I fell down yesterday while I was trying to play with the dog, just lost my balance backwards. \"    OBJECTIVE    12 min Therapeutic Exercise:  [x] See flow sheet :   Rationale: increase ROM and increase strength to improve the patients ability to perform ADL's. 10 min Therapeutic Activity:  [x]  See flow sheet : Sit to stands, bed mobility. Rationale: increase strength and increase proprioception  to improve the patients ability to perform transfers (I). 20 min Neuromuscular Re-education:  [x]  See flow sheet : Static balance, dynamic gait, marching on airex and toe taps. Rationale: improve coordination, improve balance and increase proprioception  to improve the patients ability to safely negotiate community distances. With   [x] TE   [] TA   [] neuro   [] other: Patient Education: [x] Review HEP    [] Progressed/Changed HEP based on:   [] positioning   [] body mechanics   [] transfers   [] heat/ice application    [] other:      Other Objective/Functional Measures: Occasional presents with shuffling gait pattern, primarily while turning. REquired max (A) several times during dynamic gait, primarily during 180 and 360 degrees turns, as well as tandem.     Pain Level (0-10 scale) post treatment: 0/10    ASSESSMENT/Changes in Function: Demonstrates improved ease with bed mobility. Pt has reported a few falls since IE. Presents with good static balance EO, challenged with EC. Difficulty performing dynamic balance activities, looses balance secondary to shuffling feet. Plan to D/C to HEP next visit. Patient will continue to benefit from skilled PT services to modify and progress therapeutic interventions, address functional mobility deficits, address strength deficits, analyze and cue movement patterns, analyze and modify body mechanics/ergonomics and address imbalance/dizziness to attain remaining goals. [x]  See Plan of Care  []  See progress note/recertification  []  See Discharge Summary         Progress towards goals / Updated goals:  Short Term Goals: To be accomplished in 1 weeks:              1. The pt will be I and compliant with HEP              IE- issued and instructed in HEP              Current: Met per patient report. 10/01/2020     Long Term Goals: To be accomplished in 4 weeks:              1. Improve FOTO score to 54 for improved ability for ADL              IE- 16              2. The pt will demonstrate 30\" Romberg on airex with EC for improved balance with standing activities.             CF- 15 sec.              DXPCJGQ: 20 seconds 10/6/20              3. The pt will report no falls with household ambulation.              IE- falls daily              Current: fell in garage 10/6/20; Vaishnavi Jordynels in kitchen while throwing something in the trash. 10/12/2020              4. The pt will demonstrate ability to perform supine to sit transfer with independence.               IE- min assist needed              Current: Pt able to perform supine <-> sit (I) with \"quite a bit of difficulty\". 10/16/2020              5.  The pt will demonstrate ability for 10 sit to stands without use of UEs to improve ability for transfers.               IE- UE use noted.               XMULFEQ: 20x with UE use 10/6/20    PLAN  []  Upgrade activities as tolerated     [x]  Continue plan of care  []  Update interventions per flow sheet       []  Discharge due to:_  []  Other:_      Puneet Veras, EDIE 10/20/2020  11:37 AM    Future Appointments   Date Time Provider Khoa Gupta   10/20/2020 12:00 PM Simona Fozia, PTA [facility]   10/22/2020 12:00 PM Simona Fozia, PTA [facility]   10/23/2020  3:20 PM [facility] NURSE [facility]   10/27/2020 12:00 PM Simona Fozia, PTA [facility]   11/2/2020  2:15 PM Guy Allen MD [facility]

## 2020-10-22 ENCOUNTER — HOSPITAL ENCOUNTER (OUTPATIENT)
Dept: PHYSICAL THERAPY | Age: 65
Discharge: HOME | End: 2020-10-22
Payer: MEDICARE

## 2020-10-22 PROCEDURE — 97110 THERAPEUTIC EXERCISES: CPT

## 2020-10-22 PROCEDURE — 97530 THERAPEUTIC ACTIVITIES: CPT

## 2020-10-22 PROCEDURE — 97112 NEUROMUSCULAR REEDUCATION: CPT

## 2020-10-22 NOTE — PROGRESS NOTES
[facility] [address] [address] Moni Shoemakerjenidayo Annie 53  [place], [address]  [phone] fax    Physical Therapy Discharge Summary  Patient name: Stephanie Damon Start of Care: 2020   Referral source: Trisha Lopez NP : 1955               Medical Diagnosis: Abnormal gait [R26.9]  Payor: [place] MEDICARE / Plan: VA MEDICARE PART A & B / Product Type: Medicare /  Onset Date: Worsening since 2020               Treatment Diagnosis: unsteady gait   Prior Hospitalization: see medical history Provider#: [id number]   Medications: Verified on Patient summary List    Comorbidities: parkinson's, cancer   Prior Level of Function: functionally I with daily tasks. Visits from Start of Care: 8    Missed Visits: 0  Reporting Period : 20 to 10-22-20      Summary of Care:  FOTO improved to 50%. Maintained Romberg balance on airex eyes closed for 30\". Performs sit to stands without UE's 10x. Demonstrates (I) bed transfers and sit to stands. No change in shuffling step pattern during rotations/turning with gait. Met most LTG's. D/C to HEP at this time, pt reports continued HEP compliance. Educated the importance of using an AD out in the community and when feeling tired/fatigued, pt verbally reports understanding the importance of using an AD.                   Progress towards goals   Short Term Goals: To be accomplished in 1 weeks:              1. The pt will be I and compliant with HEP              IE- issued and instructed in HEP              Current: Met per patient report. 10/01/2020     Long Term Goals: To be accomplished in 4 weeks:              1. Improve FOTO score to 54 for improved ability for ADL              IE- 16              Current: Improved to 50% 10/22/2020              2. The pt will demonstrate 30\" Romberg on airex with EC for improved balance with standing activities.               IE- 15 sec.              LTVSEON: 20 seconds 10/6/20; Met 30\" without LOB. 10/22/2020              3. The pt will report no falls with household ambulation.              IE- falls daily              Current: fell in garage 10/6/20; Shy Garcia in kitchen while throwing something in the trash. 10/12/2020              4. The pt will demonstrate ability to perform supine to sit transfer with independence.               IE- min assist needed              Current: Pt able to perform supine <-> sit (I) with \"quite a bit of difficulty\". 10/16/2020              5.  The pt will demonstrate ability for 10 sit to stands without use of UEs to improve ability for transfers.               IE- UE use noted.               ETWPOPC: 20x with UE use 10/6/20; Met, sit to stands without UE's 10x. 10/22/2020    ASSESSMENT/RECOMMENDATIONS:  [x]Discontinue therapy: [x]Patient has reached or is progressing toward set goals      []Patient is non-compliant or has abdicated      []Due to lack of appreciable progress towards set Blank 71, PT 10/22/2020 1:05 PM

## 2020-10-22 NOTE — PROGRESS NOTES
PT DAILY TREATMENT NOTE 10-18    Patient Name: Jenny Patino  Date:10/22/2020  : 1955  [x]  Patient  Verified  Payor: VA MEDICARE / Plan: VA MEDICARE PART A & B / Product Type: Medicare /    In time:12:00  Out time:12:46  Total Treatment Time (min): 46  Visit #: 8 of 8    Medicare/BCBS Only   Total Timed Codes (min):  46 1:1 Treatment Time:  44       Treatment Area: Abnormal gait [R26.9]    SUBJECTIVE  Pain Level (0-10 scale): 0/10  Any medication changes, allergies to medications, adverse drug reactions, diagnosis change, or new procedure performed?: [x] No    [] Yes (see summary sheet for update)  Subjective functional status/changes:   [] No changes reported  \"Didn't fall between visits. I'm getting out of the bed a lot easier but I know my balance is about the same. \"    OBJECTIVE    14 min Therapeutic Exercise:  [x] See flow sheet :   Rationale: increase ROM and increase strength to improve the patients ability to perform ADL's. 10 min Therapeutic Activity:  [x]  See flow sheet : sit to stand with airex, bed mobility. Rationale: improve coordination and increase proprioception  to improve the patients ability to perform transfers (I). 20 min Neuromuscular Re-education:  [x]  See flow sheet : Static balance, dynamic gait, marching on airex and toe taps. Rationale: improve coordination, improve balance and increase proprioception  to improve the patients ability to perform functional activities. With   [x] TE   [] TA   [] neuro   [] other: Patient Education: [x] Review HEP    [] Progressed/Changed HEP based on:   [] positioning   [] body mechanics   [] transfers   [] heat/ice application    [] other:      Other Objective/Functional Measures: FOTO improved to 50%. Maintained rhomberg balance on airex eyes closed for 30\". Performs sit to stands without UE's 10x. Demonstrates (I) bed transfers and sit to stands. No change in shuffling step pattern during rotations/turning with gait. Met most LTG's. D/C to HEP at this time, pt reports continued HEP compliance. Educated the importance of using an AD out in the community and when feeling tired/fatigued, pt verbally reports understanding the importance of using an AD. Pain Level (0-10 scale) post treatment: 0/10    ASSESSMENT/Changes in Function:      []  See Plan of Care  []  See progress note/recertification  [x]  See Discharge Summary         Progress towards goals / Updated goals:  Short Term Goals: To be accomplished in 1 weeks:              1. The pt will be I and compliant with HEP              IE- issued and instructed in HEP              Current: Met per patient report. 10/01/2020     Long Term Goals: To be accomplished in 4 weeks:              1. Improve FOTO score to 54 for improved ability for ADL              IE- 16   Current: Improved to 50% 10/22/2020              2. The pt will demonstrate 30\" Romberg on airex with EC for improved balance with standing activities.             QD- 15 sec.              YTXSXKE: 20 seconds 10/6/20; Met 30\" without LOB. 10/22/2020              3. The pt will report no falls with household ambulation.              IE- falls daily              Current: fell in garage 10/6/20; Renay Frida in kitchen while throwing something in the trash. 10/12/2020              4. The pt will demonstrate ability to perform supine to sit transfer with independence.               IE- min assist needed              Current: Pt able to perform supine <-> sit (I) with \"quite a bit of difficulty\". 10/16/2020              5.  The pt will demonstrate ability for 10 sit to stands without use of UEs to improve ability for transfers.               IE- UE use noted.               NCNZWLQ: 20x with UE use 10/6/20; Met, sit to stands without UE's 10x. 10/22/2020    PLAN  []  Upgrade activities as tolerated     []  Continue plan of care  []  Update interventions per flow sheet       [x]  Discharge due to: Achieved most LTG's, D/C to HEP.  []  Other:_      Edward Landa, PTA 10/22/2020  11:47 AM    Future Appointments   Date Time Provider Khoa Gupta   [facility]2020 [facility]   10/23/2020  3:20 PM [facility]   10/27/2020 12:00 PM [facility]   11/2/2020  2:15 PM Wanda Brasher MD [facility]

## 2020-10-27 ENCOUNTER — APPOINTMENT (OUTPATIENT)
Dept: PHYSICAL THERAPY | Age: 65
End: 2020-10-27
Payer: MEDICARE

## 2021-08-26 ENCOUNTER — HOSPITAL ENCOUNTER (OUTPATIENT)
Dept: PHYSICAL THERAPY | Age: 66
Discharge: HOME | End: 2021-08-26
Payer: MEDICARE

## 2021-08-26 PROCEDURE — 97163 PT EVAL HIGH COMPLEX 45 MIN: CPT

## 2021-08-26 NOTE — PROGRESS NOTES
[facility] [address] [facility] [address]  [address]  [place], [address]  [phone] fax    Plan of Care/ Statement of Necessity for Physical Therapy Services    Patient name: Ryley Rodriguez Start of Care: 2021   Referral source: Alexandra Lizama MD : 1955    Medical Diagnosis: Muscle weakness (generalized) [M62.81]  Payor: Mary Peto / Plan: VA MEDICARE PART A & B / Product Type: Medicare /  Onset Date: chronic, exacerbation over the last 6 months     Treatment Diagnosis: generalized muscle weakness, gait instability    Prior Hospitalization: see medical history Provider#: [id number]   Medications: Verified on Patient summary List    Comorbidities: Parkinson's disease, history of cancer, lumbar surgery ~9 years ago   Prior Level of Function: 6 months using wheelchair \"on-and-off\", was able to use RW around home previously - last 2-3 months constantly using wheelchair due to increasing fatigue, weakness, and poor endurance      The Plan of Care and following information is based on the information from the initial evaluation. Assessment/ key information: Patient presents for wheelchair assessment today with wife present. Patient has diagnosis of Parkinson's Disease and has noticed a significant decline over the last 6 months. Patient reports he was originally using the transport chair \"on-and-off\" about 6 months ago and ambulating with RW around home, however is now experiencing significant weakness, fatigue, and lack of endurance which has made ambulation very unsafe. Patient reports at least 6-10 falls per day with navigating around home. He lives with wife in single story home with 6 steps to enter with bilateral handrails and no ramp, although his wife is in the process of having a ramp installed for improved safety with entry/exit of home.  Patient able to ambulate 20 feet in clinic today with use of RW and CGA for safety demonstrating a shuffling gait, 2 episodes of freezing gait, and quick fatigue of the UEs requiring immediate use of facilitated sitting in chair to prevent fall. Significant decreased endurance noted today as well as, global weakness noted through bilateral UEs and LEs limiting patient's ability to self-propel in wheelchair. Globally patient's UE MMT is 3+/5 and LE MMT is 3/5 bilaterally.  strength is 5# bilaterally with patient reporting he frequently drops things as he is unable to grasp and maintain, such that is needed for self-propel in manual wheelchair. Patient's bathroom is located ~40 feet from recliner increasing fall risk and poses large safety concern. Patient's bathroom is wheelchair accessible. The patient is modified independent with ADLs and requires assistance for all IADLs. Patient is a serious fall risk due to his freezing gait episodes, quick fatigue, decreased endurance, and shuffling gait that make ambulation around home unsafe and concerning. Patient would be able to gain independence with performance of IADLs because of a power wheelchair as he will not fatigue prior to performance of the task such that would occur with propelling in manual wheelchair. A powered scooter is not appropriate for this patient due to decreased endurance and strength in bilateral UEs and tremor that would not allow patient to safely negotiate handlebars of powered scooter. A powered wheelchair will allow patient to gain independence with negotiating around home to access the shower with less risk of falls to improve and manage self-care tasks, to negotiate to closet/dresser to retrieve clothing to independently dress himself, and to access the pantry to allow patient to manage cooking/meal prep independently. Strongly recommend a powered wheelchair for this patient due to poor endurance, significant UE and LE weakness bilaterally, decreased  strength, impaired balance, and increased fall risk.  He would benefit from a powered wheelchair by improved safety, decreased falls, improved independence with IADLs, and improved overall quality of life. Evaluation Complexity History HIGH Complexity :3+ comorbidities / personal factors will impact the outcome/ POC ; Examination HIGH Complexity : 4+ Standardized tests and measures addressing body structure, function, activity limitation and / or participation in recreation  ;Presentation HIGH Complexity : Unstable and unpredictable characteristics    Overall Complexity Rating: HIGH   Problem List: pain affecting function, decrease ROM, decrease strength, impaired gait/ balance, decrease ADL/ functional abilitiies, decrease activity tolerance, decrease flexibility/ joint mobility and decrease transfer abilities   Treatment Plan may include any combination of the following: This patient will follow up with provider of choice, [facility], to obtain the most appropriate seating option. Wheelchair provided contacted. Patient / Family readiness to learn indicated by: asking questions, trying to perform skills and interest  Persons(s) to be included in education: patient (P) and family support person (FSP);list Wife  Barriers to Learning/Limitations: None  Patient Goal (s): get around the house independently, allow my knees to heal and get better to stop falling  Patient Self Reported Health Status: poor      Short Term Goals: To be accomplished in 1 treatments:  1. Evaluate patient for need for power mobility and make recommendations for improved mobility/safety at home. Frequency / Duration: Patient to be seen 1 times per week for 1 treatments.     Patient/ Caregiver education and instruction:  Diagnosis, prognosis, self care, activity modification and other mobility & wheelchair assessment      [x]  Plan of care has been reviewed with PTA    Certification Period: 8/26/2021 - 8/26/2021  Shalom Davis PT, LAKISHA 8/26/2021 6:36 PM    ________________________________________________________________________    I certify that the above Therapy Services are being furnished while the patient is under my care. I agree with the treatment plan and certify that this therapy is necessary.     Physician's Signature:____________________  Date:____________Time: _________     [facility] MD Adrian  Please sign and return to [facility] [address] [address] Andie Mcdonough [address] [place] [address] [address]  [phone] fax
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
## 2021-09-17 PROBLEM — N31.2 ATONIC BLADDER: Status: ACTIVE | Noted: 2021-09-17
